# Patient Record
Sex: FEMALE | Race: WHITE | NOT HISPANIC OR LATINO | Employment: OTHER | URBAN - METROPOLITAN AREA
[De-identification: names, ages, dates, MRNs, and addresses within clinical notes are randomized per-mention and may not be internally consistent; named-entity substitution may affect disease eponyms.]

---

## 2018-08-23 ENCOUNTER — OFFICE VISIT (OUTPATIENT)
Dept: INTERNAL MEDICINE | Facility: CLINIC | Age: 73
End: 2018-08-23
Attending: INTERNAL MEDICINE
Payer: MEDICARE

## 2018-08-23 VITALS
HEIGHT: 63 IN | OXYGEN SATURATION: 93 % | HEART RATE: 65 BPM | TEMPERATURE: 98 F | RESPIRATION RATE: 17 BRPM | SYSTOLIC BLOOD PRESSURE: 120 MMHG | WEIGHT: 125.6 LBS | BODY MASS INDEX: 22.25 KG/M2 | DIASTOLIC BLOOD PRESSURE: 60 MMHG

## 2018-08-23 DIAGNOSIS — Z00.00 MEDICARE ANNUAL WELLNESS VISIT, SUBSEQUENT: Primary | ICD-10-CM

## 2018-08-23 PROCEDURE — G0439 PPPS, SUBSEQ VISIT: HCPCS | Performed by: INTERNAL MEDICINE

## 2018-08-23 ASSESSMENT — MINI COG
TOTAL SCORE: 5
COMPLETED: YES

## 2018-08-23 NOTE — PROGRESS NOTES
"Subjective     Kandi Garnica is a 72 y.o. female who presents for a subsequent annual wellness visit.     Had both cataracts done and feels well  Due to see Dr Ford    Comprehensive Medical and Social History  Patient Active Problem List   Diagnosis   • Medicare annual wellness visit, subsequent     Past Medical History:   Diagnosis Date   • Arthritis    • Hammer toes, bilateral    • Hearing loss    • Lipid disorder      Past Surgical History:   Procedure Laterality Date   • BUNIONECTOMY     •  SECTION     • EYE SURGERY      bilateral cataracts     No Known Allergies  No current outpatient prescriptions on file.     No current facility-administered medications for this visit.      Social History     Social History   • Marital status:      Spouse name: N/A   • Number of children: N/A   • Years of education: N/A     Social History Main Topics   • Smoking status: Never Smoker   • Smokeless tobacco: Never Used   • Alcohol use None   • Drug use: Unknown   • Sexual activity: Not Asked     Other Topics Concern   • None     Social History Narrative   • None       Objective   Vitals  Vitals:    18 0922   BP: 120/60   Pulse: 65   Resp: 17   Temp: 36.7 °C (98 °F)   TempSrc: Oral   SpO2: 93%   Weight: 57 kg (125 lb 9.6 oz)   Height: 1.6 m (5' 3\")     Body mass index is 22.25 kg/m².    Advanced Care Plan  Does patient have advance directive?: Yes       Patient has Advance Directive: Advance Directive in physical chart                             PHQ  Over the Past 2 Weeks, Have You Felt Down, Depressed or Hopeless?: no  Over the Past 2 Weeks, Have You Felt Little Interest or Pleasure In Doing Things?: no                                          Mini Cog  Completed: Yes  Score: 5  Result: Negative    Get Up and Go  Result: Pass            STEADI Falls Risk  One or more falls in the last year: No           Has trouble stepping up onto a curb: No   Advised to use a cane or walker to get around safely: No "   Often has to rush to the toilet: No   Feels unsteady when walking: No   Has lost some feeling in feet: No   Often feels sad or depressed: No   Steadies self on furniture while walking at home: No   Takes medication that makes him/her feel lightheaded or more tired than usual: No   Worried about falling: No   Takes medicine to sleep or improve mood: No   Needs to push with hands when rising from a chair: No   Falls screen completed: Yes       Hearing and Vision Screening   Visual Acuity Screening    Right eye Left eye Both eyes   Without correction: 20/30 20/30 20/20   With correction:            Assessment/Plan   Problem List Items Addressed This Visit        Other    Medicare annual wellness visit, subsequent - Primary     Health maintenance measures reviewed  She is due  to see Dr De Los Santos  Declined colonoscopy.,mammogram and DEXa scan  'will see Dr Acuna for hearing eval  She has a healthy diet and exercises regularly               See Patient Instructions (the written plan) which was given to the patient for PPPS and health risk factors with interventions.

## 2018-08-24 NOTE — ASSESSMENT & PLAN NOTE
Health maintenance measures reviewed  She is due  to see Dr De Los Santos  Declined colonoscopy.,mammogram and DEXa scan  'will see Dr Acuna for hearing eval  She has a healthy diet and exercises regularly

## 2018-10-22 ENCOUNTER — OFFICE VISIT (OUTPATIENT)
Dept: OTOLARYNGOLOGY | Facility: CLINIC | Age: 73
End: 2018-10-22
Payer: MEDICARE

## 2018-10-22 ENCOUNTER — PROCEDURE VISIT (OUTPATIENT)
Dept: OTOLARYNGOLOGY | Facility: CLINIC | Age: 73
End: 2018-10-22
Payer: MEDICARE

## 2018-10-22 VITALS
WEIGHT: 130 LBS | HEIGHT: 63 IN | SYSTOLIC BLOOD PRESSURE: 130 MMHG | BODY MASS INDEX: 23.04 KG/M2 | DIASTOLIC BLOOD PRESSURE: 84 MMHG

## 2018-10-22 DIAGNOSIS — H61.23 BILATERAL IMPACTED CERUMEN: ICD-10-CM

## 2018-10-22 DIAGNOSIS — H90.3 SENSORINEURAL HEARING LOSS (SNHL), BILATERAL: Primary | ICD-10-CM

## 2018-10-22 DIAGNOSIS — H90.3 SENSORINEURAL HEARING LOSS (SNHL) OF BOTH EARS: Primary | ICD-10-CM

## 2018-10-22 PROCEDURE — 92557 COMPREHENSIVE HEARING TEST: CPT | Performed by: AUDIOLOGIST

## 2018-10-22 PROCEDURE — 99203 OFFICE O/P NEW LOW 30 MIN: CPT | Mod: 25 | Performed by: OTOLARYNGOLOGY

## 2018-10-22 PROCEDURE — 92567 TYMPANOMETRY: CPT | Performed by: AUDIOLOGIST

## 2018-10-22 PROCEDURE — 99999 PR OFFICE/OUTPT VISIT,PROCEDURE ONLY: CPT | Performed by: AUDIOLOGIST

## 2018-10-22 ASSESSMENT — ENCOUNTER SYMPTOMS
FATIGUE: 0
WEAKNESS: 0
VOICE CHANGE: 0
PALPITATIONS: 0
POLYPHAGIA: 0
RHINORRHEA: 0
DIZZINESS: 0
NAUSEA: 0
DYSPHORIC MOOD: 0
BACK PAIN: 0
DIARRHEA: 0
FREQUENCY: 0
SINUS PRESSURE: 0
SHORTNESS OF BREATH: 0
MYALGIAS: 0
ADENOPATHY: 0
SLEEP DISTURBANCE: 0
ARTHRALGIAS: 0
HEADACHES: 0
CONSTIPATION: 0
FEVER: 0
TROUBLE SWALLOWING: 0
WHEEZING: 0
COUGH: 0
VOMITING: 0
NERVOUS/ANXIOUS: 0

## 2018-10-22 NOTE — PROGRESS NOTES
[facility]  [address], [address]  [place] [address]  Phone: [phone]  Fax: [phone]      Patient ID: Kandi Garnica                              : 1945    Visit Date: 10/22/2018  Referring Provider: Mia Marques MD    Chief Complaint: Hearing Loss    Kandi Garnica is a 72 y.o. female who presented to the [place] office today for consultation in regard to hearing loss.    I last saw Ms. Garnica over 3 years ago.  At the time she had bilateral sensorineural hearing loss for which amplification was an option but was not deemed necessary by the patient.  Now, her hearing seems to have gotten worse.  She asks her grandchildren to repeat themselves frequently and can not hear the television when they set the volume.  She sometimes feels as if she is lipreading.  She denies tinnitus and has not had vertigo.  She has never undergone otologic surgery nor has she suffered from otitis media with effusion.  She was exposed to some loud music in her youth but has not experienced significant noise trauma as an adult.    Kandi is otherwise feeling quite well.  She has not had allergic rhinitis this season and denies any recent surgeries.    Review of Systems   Constitutional: Negative for fatigue and fever.   HENT: Positive for hearing loss. Negative for congestion, nosebleeds, postnasal drip, rhinorrhea, sinus pressure, tinnitus, trouble swallowing and voice change.    Eyes: Negative for visual disturbance.   Respiratory: Negative for cough, shortness of breath and wheezing.    Cardiovascular: Negative for chest pain and palpitations.   Gastrointestinal: Negative for constipation, diarrhea, nausea and vomiting.   Endocrine: Negative for polyphagia and polyuria.   Genitourinary: Negative for frequency.   Musculoskeletal: Negative for arthralgias, back pain, gait problem and myalgias.   Skin: Negative for rash.   Allergic/Immunologic: Negative for environmental allergies.  "  Neurological: Negative for dizziness, weakness and headaches.   Hematological: Negative for adenopathy.   Psychiatric/Behavioral: Negative for dysphoric mood and sleep disturbance. The patient is not nervous/anxious.      Current Medications: None.    Past Medical History: Reviewed with the patient, no significant past medical history except for motion sickness.    Past Surgical History: Status post  section and 3 foot surgeries for bunion and hammertoes.    Social History: The patient is .  She has adult children.  She is retired.  She rarely drinks alcohol.  She has never used tobacco.  She has been exposed to loud music in the past.    Family History: Not contributory to the patient's current problem.    Allergies: No known drug allergies.    Physical Exam:  Vitals: /84   Ht 1.6 m (5' 3\")   Wt 59 kg (130 lb)   BMI 23.03 kg/m²   General:  Well-developed, well-nourished 72 y.o. in no acute distress.  Voice: Normal without hoarseness, breathiness or stridor.  Head/face:  No scars or lesions.  No parotid masses. No presinus tenderness. Seventh cranial nerves intact.  Eyes:  Extraocular movements and gaze alignment normal.  Ears: Auricles normal.  External auditory canals clear to of minimal dry cerumen with micro-suction under binocular magnification.  Tympanic membranes clear, mobile and without retractions.  Nose:  Dorsum straight.  Septum sinusoidal anteriorly without obstruction.  Turbinates pink, normal in size and orientation.  No pus, polyps or crusts.  Oral Cavity/oropharynx:  Normal tongue thrust. Normal gag reflex. No masses, leukoplakia, erythroplakia, ulcers or other abnormalities at the tongue, floor of mouth, buccal mucosa, palate or posterior pharyngeal wall.  Larynx/hypopharynx:  Normal supraglottis.  Vocal folds smooth and white.  Arytenoids sharp and mobile.  Interarytenoid and post-glottic mucosa normal.  No masses at the base of tongue, vallecula or piriform " sinuses.  Neck:  No masses, adenopathy, cervical spasm or thyroid enlargement.  Cranial Nerves II through XII: Grossly intact.  Mental status: Awake, alert and oriented ×3.  No depression or anxiety.    Audiogram: Symmetrical mild sloping to moderate sensorineural hearing loss above 1000 Hz bilaterally.  Discrimination scores: 100% at 65 dB on the right, 96% at 65 dB on the left.    Tympanogram: We will bilaterally.    Impression:  1.  Bilateral sensorineural hearing loss for which amplification is indicated.  2.  Cerumen impactions, removed with symptom relief.    Recommendations and Plan:  1.  The patient was encouraged to return to be fitted with hearing aids or to see any other reputable audiologist for this service.  2.  Debrox, 2 drops to each ear on the first and 15th nights of the month.  3.  Follow-up in 1 year for an ear check.        Meenakshi Acuna MD

## 2018-10-22 NOTE — PATIENT INSTRUCTIONS
1.  You have hearing loss for which hearing aids would be helpful.  We can help you here at the [place] office or you can visit any reputable audiologist.  Check with your secondary insurer to see if you should go somewhere specific.  If you want to come to [place], call and ask for a hearing aid evaluation with Dr. Gibson.  2.  Debrox, 2 drops to each ear on the first and 15th nights of the month.  3.  Follow-up in 1 year for an ear check.

## 2019-09-27 ENCOUNTER — OFFICE VISIT (OUTPATIENT)
Dept: INTERNAL MEDICINE | Facility: CLINIC | Age: 74
End: 2019-09-27
Payer: MEDICARE

## 2019-09-27 VITALS
DIASTOLIC BLOOD PRESSURE: 82 MMHG | SYSTOLIC BLOOD PRESSURE: 126 MMHG | TEMPERATURE: 98.4 F | WEIGHT: 129.4 LBS | HEART RATE: 66 BPM | RESPIRATION RATE: 18 BRPM | BODY MASS INDEX: 22.93 KG/M2 | HEIGHT: 63 IN

## 2019-09-27 DIAGNOSIS — Z00.00 MEDICARE ANNUAL WELLNESS VISIT, SUBSEQUENT: Primary | ICD-10-CM

## 2019-09-27 DIAGNOSIS — R73.9 HYPERGLYCEMIA: ICD-10-CM

## 2019-09-27 DIAGNOSIS — E78.5 HYPERLIPIDEMIA, UNSPECIFIED HYPERLIPIDEMIA TYPE: ICD-10-CM

## 2019-09-27 DIAGNOSIS — E55.9 VITAMIN D DEFICIENCY: ICD-10-CM

## 2019-09-27 LAB
25(OH)D3 SERPL-MCNC: 24 NG/ML (ref 30–100)
ALBUMIN SERPL-MCNC: 4 G/DL (ref 3.4–5)
ALP SERPL-CCNC: 48 IU/L (ref 35–126)
ALT SERPL-CCNC: 18 IU/L (ref 11–54)
ANION GAP SERPL CALC-SCNC: 8 MEQ/L (ref 3–15)
AST SERPL-CCNC: 21 IU/L (ref 15–41)
BASOPHILS # BLD: 0.04 K/UL (ref 0.01–0.1)
BASOPHILS NFR BLD: 1 %
BILIRUB SERPL-MCNC: 0.8 MG/DL (ref 0.3–1.2)
BUN SERPL-MCNC: 12 MG/DL (ref 8–20)
CALCIUM SERPL-MCNC: 9.7 MG/DL (ref 8.9–10.3)
CHLORIDE SERPL-SCNC: 106 MEQ/L (ref 98–109)
CHOLEST SERPL-MCNC: 238 MG/DL
CO2 SERPL-SCNC: 29 MEQ/L (ref 22–32)
CREAT SERPL-MCNC: 0.8 MG/DL
DIFFERENTIAL METHOD BLD: NORMAL
EOSINOPHIL # BLD: 0.08 K/UL (ref 0.04–0.36)
EOSINOPHIL NFR BLD: 1.9 %
ERYTHROCYTE [DISTWIDTH] IN BLOOD BY AUTOMATED COUNT: 13.2 % (ref 11.7–14.4)
GFR SERPL CREATININE-BSD FRML MDRD: >60 ML/MIN/1.73M*2
GLUCOSE SERPL-MCNC: 87 MG/DL (ref 70–99)
HCT VFR BLDCO AUTO: 41.4 %
HDLC SERPL-MCNC: 97 MG/DL
HDLC SERPL: 2.5 {RATIO}
HGB BLD-MCNC: 13.2 G/DL
IMM GRANULOCYTES # BLD AUTO: 0.01 K/UL (ref 0–0.08)
IMM GRANULOCYTES NFR BLD AUTO: 0.2 %
LDLC SERPL CALC-MCNC: 127 MG/DL
LYMPHOCYTES # BLD: 1.3 K/UL (ref 1.2–3.5)
LYMPHOCYTES NFR BLD: 30.9 %
MCH RBC QN AUTO: 30.9 PG (ref 28–33.2)
MCHC RBC AUTO-ENTMCNC: 31.9 G/DL (ref 32.2–35.5)
MCV RBC AUTO: 97 FL (ref 83–98)
MONOCYTES # BLD: 0.5 K/UL (ref 0.28–0.8)
MONOCYTES NFR BLD: 11.9 %
NEUTROPHILS # BLD: 2.28 K/UL (ref 1.7–7)
NEUTS SEG NFR BLD: 54.1 %
NONHDLC SERPL-MCNC: 141 MG/DL
NRBC BLD-RTO: 0 %
PDW BLD AUTO: 11.8 FL (ref 9.4–12.3)
PLATELET # BLD AUTO: 244 K/UL
POTASSIUM SERPL-SCNC: 4.7 MEQ/L (ref 3.6–5.1)
PROT SERPL-MCNC: 6.1 G/DL (ref 6–8.2)
RBC # BLD AUTO: 4.27 M/UL (ref 3.93–5.22)
SODIUM SERPL-SCNC: 143 MEQ/L (ref 136–144)
TRIGL SERPL-MCNC: 69 MG/DL (ref 30–149)
TSH SERPL DL<=0.05 MIU/L-ACNC: 2.23 MIU/L (ref 0.34–5.6)
WBC # BLD AUTO: 4.21 K/UL

## 2019-09-27 PROCEDURE — 80053 COMPREHEN METABOLIC PANEL: CPT | Performed by: INTERNAL MEDICINE

## 2019-09-27 PROCEDURE — 84443 ASSAY THYROID STIM HORMONE: CPT | Performed by: INTERNAL MEDICINE

## 2019-09-27 PROCEDURE — 85025 COMPLETE CBC W/AUTO DIFF WBC: CPT | Performed by: INTERNAL MEDICINE

## 2019-09-27 PROCEDURE — 84478 ASSAY OF TRIGLYCERIDES: CPT | Performed by: INTERNAL MEDICINE

## 2019-09-27 PROCEDURE — G0439 PPPS, SUBSEQ VISIT: HCPCS | Performed by: INTERNAL MEDICINE

## 2019-09-27 PROCEDURE — 82306 VITAMIN D 25 HYDROXY: CPT | Performed by: INTERNAL MEDICINE

## 2019-09-27 PROCEDURE — 83036 HEMOGLOBIN GLYCOSYLATED A1C: CPT | Performed by: INTERNAL MEDICINE

## 2019-09-27 ASSESSMENT — MINI COG
TOTAL SCORE: 5
COMPLETED: YES

## 2019-09-27 NOTE — ASSESSMENT & PLAN NOTE
Health maintenance measures reviewed  She is due  to see Dr De Los Santos  Declined colonoscopy.,mammogram and DEXa scan  'sees  Dr Acuna for hearing eval  She has a healthy diet and exercises regularly  She did well on mini cog  and clock test  Will consult podiatry for foot pain

## 2019-09-27 NOTE — PATIENT INSTRUCTIONS
PLAN    Get fasting blood work  Cont to eat healthy and exercise   Drink lots of fluids  Consult podiatrist  See me in a year                         Your Personalized Prevention Plan Services (PPPS)    Preventive Services Checklist (Assumes Average Risk Unless Otherwise Noted):    Health Maintenance Topics with due status: Overdue       Topic Date Due    DEXA Scan 1945    DTaP, Tdap, and Td Vaccines 11/08/1963    Zoster Vaccine 11/08/1994    Mammogram 11/08/1994    Colonoscopy 11/08/1994    Pneumococcal (65 years and older) 11/08/2009    Influenza Vaccine 08/01/2019    Medicare Annual Wellness Visit 08/23/2019     Health Maintenance Topics with due status: Not Due       Topic Last Completion Date    Annual Falls Risk Screening 09/27/2019     Health Maintenance Topics with due status: Completed / Addressed / Aged Out       Topic Last Completion Date    Pneumococcal 01/01/2007    Meningococcal ACWY Aged Out    Varicella Vaccines Aged Out    HIB Vaccines Aged Out    IPV Vaccines Aged Out    HPV Vaccines Aged Out       You May Be Eligible for These Additional Preventive Services   (Assumes Average Risk Unless Otherwise Noted)  Diabetes Screening Any 1 risk factor: hypertension, dyslipidemia, obesity, high glucose; or Any 2 risk factors: >=66yo, overweight, family history diabetes (covered every 6 months)   Hepatitis C Screening Any 1 risk factor: 1) blood transfusion before 1992,   2) current or past injection drug use (annually for high risk; if born between 2241-6805, see above for status).   Vaccine: Hepatitis B As necessary if at-risk: hemophilia, ESRD, diabetes, living with individual infected with hep B, healthcare worker with frequent contact with blood/bodily fluids (series covered once)   Sexually Transmitted Diseases (STDs) As necessary chlamydia, gonorrhea, syphilis, hepatitis B (covered annually)  HIV if any 1 risk factor present: 1) <16yo or >66yo and at increased risk or 2) 15-66yo and ask for it  (covered annually)   Lung Cancer Screening Low dose chest CT if all 3 risk factors: 1) 55-76yo, 2) smoker or quit within last 15y, 3) >=30 pack years (covered annually).  No results found for this or any previous visit.     Cholesterol Screening Both risk factors: 1) >=21yo and 2)  increased risk coronary artery disease (covered every 5 years).     Breast Cancer Screening Covered once 35-40yo, annually >=41yo (if >=51yo, see above for status).         Health Risk Factors with Personalized Education:  ----------------------------------------------------------------------------------------------------------------------  Controlling Your Blood Pressure  · Maintain a normal weight (body mass index between 18.5 and 24.9).  · Eat more fruit, vegetables and low-fat dairy.  · Eat less saturated fat and total fat.  · Lower your sodium (salt) intake.  Try to stay under 1500 mg per day, but if you cannot get your intake to be that low, at least lower it by 1000 mg.  · Stay active.  Try to get at least 90 to 150 minutes of exercise per week.  Try brisk walking, swimming, bicycling or dancing.  · Limit alcohol intake.  When you do consume alcohol, drink no more than 1 drink per day.  · If you have been prescribed medication, take it regularly and exactly as prescribed.  Let your PCP know if you have any problems or questions about your medication.  · Check your blood pressure at home or at the store.  Write down your readings and share them with your PCP  ----------------------------------------------------------------------------------------------------------------------  Controlling Your Cholesterol  · Reduce the amount of saturated and trans fat in your diet.  Limit intake of red meat.  Consume only low-fat or non-fat/skim dairy.  Limit fried food.  Cook with vegetable oils.  · Reduce your intake of sugary foods, sugary drinks and alcohol.  · Eat a diet high in fruit, vegetables and whole grains.  · Get protein from fish,  poultry and a small portion of nuts.  · Stay active.  Try to get at least 90 to 150 minutes of exercise per week.  Try brisk walking, swimming, bicycling or dancing.  · Maintain a healthy weight by balancing your diet and exercise.  · If you have been prescribed medication, take it regularly and exactly as prescribed. Let your PCP know if you have any problems or questions about your medication.  · It’s important to know your cholesterol numbers.  When recommended by your PCP, get the cholesterol blood test.  ----------------------------------------------------------------------------------------------------------------------  Maintaining Strong Bones  · Try to get at least 90 to 150 minutes of weight-bearing exercise per week.  · Ensure intake of at least 1200mg of calcium per day.  Eat foods high in calcium like milk and other dairy, green vegetables, fruit, canned fish with soft and edible bones, nuts, calcium-set tofu.  Some foods are calcium-fortified, like bread, cereal, fruit juices and mineral water.  · Help your body make vitamin D by getting 10-15 minutes per day of sunlight.    · Ensure intake of at least 600IU of vitamin D per day.  Eat foods high in vitamin D like oily fish (salmon, sardines, mackerel) and eggs.  Some foods are fortified with vitamin D, like dairy and cereals.  · Avoid high amounts of caffeine and salt, since they can cause the body to loose calcium.  · Limit alcohol intake, since it is associated with weaker bones and is associated with falls and fractures.  · Limit intake of fizzy drinks.  ----------------------------------------------------------------------------------------------------------------------  Reducing Your Risk of Falls  · Tell your PCP if any of your medications make you feel tired, dizzy, lightheaded or off-balance.  · Maintain coordination, flexibility and balance by ensuring regular physical activity.  · Limit alcohol intake to 1 drink per day.  Consider avoiding  all alcohol intake.  · Ensure good vision.  Visit an ophthalmologist or optometrist regularly for vision screening or to make sure your glasses / contact lens prescription is correct.  If you need glasses or contacts, wear them.  When you get new glasses or contacts, take time to get used to them.  Do not wear sunglasses or tinted lenses when indoors.  · Ensure good hearing.  Have your hearing checked if you are having trouble hearing, or family and friends think you cannot hear them.  If you need a hearing aid, be sure it fits well and wear it.  · Get enough rest.  Ensure about 7-9 hours of sleep every day.  · Get up slowly from your bed or chairs.  Do not start walking until you are sure you feel steady.  · Wear non-skid, rubber-soled, low-heeled shoes.  Do not walk in socks, or in shoes and slippers with smooth soles.  · If your PCP or therapist recommends using a cane or walker, use it regularly.  · Make your home safer.  Increase lighting throughout the house, especially at the top and bottom of stairs.  Ensure lighting is easily turned on when getting up in the middle of the night.  Make sure there are two secure rails on all stairs.  Install grab bars in the bathtub / shower and near the toilet.  Consider using a shower chair and / or a hand-held shower.  · Spread sand or salt on icy surfaces.  Beware of wet surfaces, which can be icy.  · Tell your PCP if you have fallen.

## 2019-09-27 NOTE — PROGRESS NOTES
"Subjective     Kandi Garnica is a 73 y.o. female who presents for a subsequent annual wellness visit.     Here for wellness exam  Has been tired  And she just came back from a 14 day trip to [place]  Also wants to consult ortho for left foot pain and known bunion.      Comprehensive Medical and Social History  Patient Active Problem List   Diagnosis   • Medicare annual wellness visit, subsequent   • Hyperlipidemia   • Hyperglycemia   • Vitamin D deficiency     Past Medical History:   Diagnosis Date   • Arthritis    • Hammer toes, bilateral    • Hearing loss    • Lipid disorder      Past Surgical History:   Procedure Laterality Date   • BUNIONECTOMY     •  SECTION     • EYE SURGERY      bilateral cataracts     No Known Allergies  No current outpatient prescriptions on file.     No current facility-administered medications for this visit.      Social History     Social History   • Marital status:      Spouse name: N/A   • Number of children: N/A   • Years of education: N/A     Social History Main Topics   • Smoking status: Never Smoker   • Smokeless tobacco: Never Used   • Alcohol use Yes      Comment: wine   • Drug use: No   • Sexual activity: Not Asked     Other Topics Concern   • None     Social History Narrative   • None       Objective   Vitals  Vitals:    19 0915   BP: 126/82   Pulse: 66   Resp: 18   Temp: 36.9 °C (98.4 °F)   TempSrc: Oral   Weight: 58.7 kg (129 lb 6.4 oz)   Height: 1.6 m (5' 3\")     Body mass index is 22.92 kg/m².    Advanced Care Plan  Does patient have advance directive?: Yes       Patient has Advance Directive: Advance Directive from prior hospitalizaton in EMR                             PHQ  Over the Past 2 Weeks, Have You Felt Down, Depressed or Hopeless?: no  Over the Past 2 Weeks, Have You Felt Little Interest or Pleasure In Doing Things?: no                                          Mini Cog  Completed: Yes  Score: 5  Result: Negative    Get Up and Go  Result: " Pass            STEADI Falls Risk  One or more falls in the last year: No           Has trouble stepping up onto a curb: No   Advised to use a cane or walker to get around safely: No   Often has to rush to the toilet: No   Feels unsteady when walking: No   Has lost some feeling in feet: No   Often feels sad or depressed: No   Steadies self on furniture while walking at home: No   Takes medication that makes him/her feel lightheaded or more tired than usual: No   Worried about falling: No   Takes medicine to sleep or improve mood: No   Needs to push with hands when rising from a chair: No   Falls screen completed: Yes       Hearing and Vision Screening   Visual Acuity Screening    Right eye Left eye Both eyes   Without correction: 20/30 20/40 20/30   With correction:        See HRA for relevant hearing screening response.      Assessment/Plan   Diagnoses and all orders for this visit:    Medicare annual wellness visit, subsequent (Primary)  Assessment & Plan:  Health maintenance measures reviewed  She is due  to see Dr De Los Santos  Declined colonoscopy.,mammogram and DEXa scan  'sees  Dr Acuna for hearing eval  She has a healthy diet and exercises regularly  She did well on mini cog  and clock test  Will consult podiatry for foot pain      Hyperlipidemia, unspecified hyperlipidemia type  Assessment & Plan:   history of mild elevation of lipids  Check fasting blood work for lipids    Orders:  -     CBC and differential  -     Comprehensive metabolic panel  -     Lipid panel  -     TSH  -     CBC  -     Diff Count    Hyperglycemia  Assessment & Plan:  Check FBS and A1c    Orders:  -     Hemoglobin A1c    Vitamin D deficiency  Assessment & Plan:  Check vitamin D level    Orders:  -     Vitamin D 25 hydroxy      See Patient Instructions (the written plan) which was given to the patient for PPPS and health risk factors with interventions.

## 2019-09-28 LAB
EST. AVERAGE GLUCOSE BLD GHB EST-MCNC: 108 MG/DL
HBA1C MFR BLD HPLC: 5.4 %

## 2019-09-30 ENCOUNTER — TELEPHONE (OUTPATIENT)
Dept: INTERNAL MEDICINE | Facility: CLINIC | Age: 74
End: 2019-09-30

## 2019-09-30 NOTE — LETTER
September 30, 2019     Kandi Garnica  [address] [place] [address]      Dear Ms. Garnica:    Below are the results from your recent visit:    Resulted Orders   Comprehensive metabolic panel   Result Value Ref Range    Sodium 143 136 - 144 mEQ/L    Potassium 4.7 3.6 - 5.1 mEQ/L    Chloride 106 98 - 109 mEQ/L    CO2 29 22 - 32 mEQ/L    BUN 12 8 - 20 mg/dL    Creatinine 0.8 0.6 - 1.1 mg/dL    Glucose 87 70 - 99 mg/dL    Calcium 9.7 8.9 - 10.3 mg/dL    AST (SGOT) 21 15 - 41 IU/L    ALT (SGPT) 18 11 - 54 IU/L    Alkaline Phosphatase 48 35 - 126 IU/L    Total Protein 6.1 6.0 - 8.2 g/dL    Albumin 4.0 3.4 - 5.0 g/dL    Bilirubin, Total 0.8 0.3 - 1.2 mg/dL    eGFR >60.0 >=60.0 mL/min/1.73m*2    Anion Gap 8 3 - 15 mEQ/L   Hemoglobin A1c   Result Value Ref Range    Hemoglobin A1C 5.4 <5.7 %    Estimated Ave Glucose 108 mg/dL      Comment:        Estimate of average glucose concentration continuously over 24 hours for previous 2 to 3 months(Per ADA Recommendation).   Lipid panel   Result Value Ref Range    Triglycerides 69 30 - 149 mg/dL    Cholesterol 238 (H) <=200 mg/dL    HDL 97 >=55 mg/dL      Comment:      2001 NCEP GUIDELINES  <40 mg/dL Low  >=60 mg/dL High    LDL Calculated 127 (H) <=100 mg/dL      Comment:      ATP III GUIDELINES  Optimal: <100 mg/dL  Near/Above Optimal: 100-129 mg/dL  Borderline High: 130-159 mg/dL  High: 160-189 mg/dL  Very High: >190 mg/dL      Non-HDL, Calculated 141 mg/dL    RISK 2.5 <=5.0      Comment:      RISK FEMALE ([place] [employer])  1/2 Average 3.3  Average 4.4  2X Average 7.1  3X Average 11.0   TSH   Result Value Ref Range    TSH 2.23 0.34 - 5.60 mIU/L   Vitamin D 25 hydroxy   Result Value Ref Range    Vit D, 25-Hydroxy 24 (L) 30 - 100 ng/mL      Comment:        INTERPRETATION  VITAMIN D STATUS   25 OH VITAMIN D    Deficiency          <20 ng/mL    Insufficiency     20-29 ng/mL    Sufficiency       ng/mL    Toxicity           >100 ng/mL  This test measures a  total of both 25 OH D2 and 25 OH D3.   CBC   Result Value Ref Range    WBC 4.21 3.80 - 10.50 K/uL    RBC 4.27 3.93 - 5.22 M/uL    Hemoglobin 13.2 11.8 - 15.7 g/dL    Hematocrit 41.4 35.0 - 45.0 %    MCV 97.0 83.0 - 98.0 fL    MCH 30.9 28.0 - 33.2 pg    MCHC 31.9 (L) 32.2 - 35.5 g/dL    RDW 13.2 11.7 - 14.4 %    Platelets 244 150 - 369 K/uL    MPV 11.8 9.4 - 12.3 fL   Diff Count   Result Value Ref Range    Differential Type Auto     nRBC 0.0 <=0.0 %    Immature Granulocytes 0.2 %    Neutrophils 54.1 %    Lymphocytes 30.9 %    Monocytes 11.9 %    Eosinophils 1.9 %    Basophils 1.0 %    Immature Granulocytes, Absolute 0.01 0.00 - 0.08 K/uL    Neutrophils, Absolute 2.28 1.70 - 7.00 K/uL    Lymphocytes, Absolute 1.30 1.20 - 3.50 K/uL    Monocytes, Absolute 0.50 0.28 - 0.80 K/uL    Eosinophils, Absolute 0.08 0.04 - 0.36 K/uL    Basophils, Absolute 0.04 0.01 - 0.10 K/uL     As per my phone message, this is a copy of the test results  Please call me back if you have any questions     Sincerely,          Mia Marques MD

## 2019-09-30 NOTE — TELEPHONE ENCOUNTER
Left message with the patient regarding test results and to call me back if she has any questions after she gets a copy

## 2020-11-24 ENCOUNTER — OFFICE VISIT (OUTPATIENT)
Dept: INTERNAL MEDICINE | Facility: CLINIC | Age: 75
End: 2020-11-24
Payer: MEDICARE

## 2020-11-24 VITALS
WEIGHT: 125 LBS | OXYGEN SATURATION: 98 % | HEIGHT: 63 IN | DIASTOLIC BLOOD PRESSURE: 70 MMHG | BODY MASS INDEX: 22.15 KG/M2 | HEART RATE: 81 BPM | RESPIRATION RATE: 16 BRPM | TEMPERATURE: 97.1 F | SYSTOLIC BLOOD PRESSURE: 110 MMHG

## 2020-11-24 DIAGNOSIS — E78.5 HYPERLIPIDEMIA, UNSPECIFIED HYPERLIPIDEMIA TYPE: ICD-10-CM

## 2020-11-24 DIAGNOSIS — Z00.00 MEDICARE ANNUAL WELLNESS VISIT, SUBSEQUENT: Primary | ICD-10-CM

## 2020-11-24 DIAGNOSIS — M79.673 PAIN OF FOOT, UNSPECIFIED LATERALITY: ICD-10-CM

## 2020-11-24 PROCEDURE — G0439 PPPS, SUBSEQ VISIT: HCPCS | Performed by: INTERNAL MEDICINE

## 2020-11-24 ASSESSMENT — MINI COG
TOTAL SCORE: 5
COMPLETED: YES

## 2020-11-24 ASSESSMENT — PATIENT HEALTH QUESTIONNAIRE - PHQ9: SUM OF ALL RESPONSES TO PHQ9 QUESTIONS 1 & 2: 0

## 2020-11-24 NOTE — PROGRESS NOTES
"Subjective     Kandi Garnica is a 75 y.o. female who presents for a subsequent annual wellness visit.     Patient Care Team:  Mia Marques MD as PCP - General      Here for wellness exam  Has been trying to follow guidelines during the pandemic  She has issues with arthritis but exercises daily  Not keen to get blood work this year  Would like to see podiatry  Her gyn is Dr Ford  Sees Dr barker for hearing loss and will consider hearing aids next year    Comprehensive Medical and Social History  Patient Active Problem List   Diagnosis   • Medicare annual wellness visit, subsequent   • Hyperlipidemia   • Hyperglycemia   • Vitamin D deficiency   • Pain of foot     Past Medical History:   Diagnosis Date   • Arthritis    • Hammer toes, bilateral    • Hearing loss    • Lipid disorder      Past Surgical History:   Procedure Laterality Date   • BUNIONECTOMY     •  SECTION     • EYE SURGERY      bilateral cataracts     No Known Allergies  No current outpatient medications on file.     No current facility-administered medications for this visit.      Social History     Tobacco Use   • Smoking status: Never Smoker   • Smokeless tobacco: Never Used   Substance Use Topics   • Alcohol use: Yes     Comment: wine   • Drug use: No     Family History   Problem Relation Age of Onset   • Heart disease Biological Mother    • Heart disease Biological Father        Objective   Vitals  Vitals:    20 0717   BP: 110/70   Pulse: 81   Resp: 16   Temp: 36.2 °C (97.1 °F)   TempSrc: Oral   SpO2: 98%   Weight: 56.7 kg (125 lb)   Height: 1.6 m (5' 3\")     Body mass index is 22.14 kg/m².    Advanced Care Plan  Does patient have advance directive?: Yes           Does patient have current OOH DNR form?: Yes                         PHQ  Will the patinet answer the depression questions?: Yes   Over the past 2 weeks, how often have you been bothered by feeling little interest or pleasure in doing things?: Not at all   Over the " past 2 weeks, how often have you been bothered by feeling down, depressed, or hopeless?: Not at all   Depression Risk: 0                                             Mini Cog  Completed: Yes  Score: 5  Result: Negative      Get Up and Go  Result: Pass    STEADI Falls Risk  One or more falls in the last year: No           Has trouble stepping up onto a curb: No   Advised to use a cane or walker to get around safely: No   Often has to rush to the toilet: No   Feels unsteady when walking: No   Has lost some feeling in feet: No   Often feels sad or depressed: No   Steadies self on furniture while walking at home: No   Takes medication that makes him/her feel lightheaded or more tired than usual: No   Worried about falling: No   Takes medicine to sleep or improve mood: No   Needs to push with hands when rising from a chair: No   Falls screen completed: Yes     Hearing and Vision Screening   Hearing Screening    125Hz 250Hz 500Hz 1000Hz 2000Hz 3000Hz 4000Hz 6000Hz 8000Hz   Right ear:            Left ear:               Visual Acuity Screening    Right eye Left eye Both eyes   Without correction: 20/20 20/30 20/20   With correction:        See HRA for relevant hearing screening response.    Assessment/Plan   Diagnoses and all orders for this visit:    Medicare annual wellness visit, subsequent (Primary)  Assessment & Plan:  Health maintenance measures reviewed  Sees dr Ford for gyn  Labs reviewed from last year  Declined colonoscopy.,mammogram and DEXa scan  'sees  Dr Acuna for hearing eval  She has a healthy diet and exercises regularly  She did well on mini cog  and clock test  Safety precautions reveiwed  Eye exams advsied  Declined vaccines but will consider COVID vaccine      Pain of foot, unspecified laterality  Assessment & Plan:  Consult Dr ned Cortez    Orders:  -     Ambulatory referral to Podiatry; Future    Hyperlipidemia, unspecified hyperlipidemia type  Assessment & Plan:  History of elevated  lipids  Diet and exercise discussed        See Patient Instructions (the written plan) which was given to the patient for PPPS and health risk factors with interventions.

## 2020-11-25 NOTE — ASSESSMENT & PLAN NOTE
Health maintenance measures reviewed  Sees dr Ford for gyn  Labs reviewed from last year  Declined colonoscopy.,mammogram and DEXa scan  'sees  Dr Acuna for hearing eval  She has a healthy diet and exercises regularly  She did well on mini cog  and clock test  Safety precautions reveiwed  Eye exams advsied  Declined vaccines but will consider COVID vaccine

## 2021-02-21 ENCOUNTER — IMMUNIZATION (OUTPATIENT)
Dept: IMMUNIZATION | Facility: CLINIC | Age: 76
End: 2021-02-21

## 2021-02-23 ENCOUNTER — TELEPHONE (OUTPATIENT)
Dept: GASTROENTEROLOGY | Facility: CLINIC | Age: 76
End: 2021-02-23

## 2021-03-14 ENCOUNTER — IMMUNIZATION (OUTPATIENT)
Dept: IMMUNIZATION | Facility: CLINIC | Age: 76
End: 2021-03-14

## 2021-04-14 DIAGNOSIS — Z23 ENCOUNTER FOR IMMUNIZATION: ICD-10-CM

## 2021-05-20 NOTE — PATIENT INSTRUCTIONS
From: Allison Spann  To: Brenda Hood  Sent: 5/20/2021 12:42 PM CDT  Subject: Medication Question    Hi Dr. Hood!    I've been using the Lidocaine patches as directed and they don't really do much for the hip pain. Could I maybe get a prescription for some kind of pain pill? I see the Orthopedic Surgeon, Dr. Presley, on June 4th. Hopefully, I can get a Cortisone shot scheduled after that. This pain is preventing me from doing everyday things and is basically quite bad every time I move. It even hurts when I lay in bed at night.  Thanks,  Allison   PLAN    Deep breathing exercises advsied  Eye exam  Podiatry  Dr Montana Buenrostro  See me rashida  year                       Your Personalized Prevention Plan Services (PPPS)    Preventive Services Checklist (Assumes Average Risk Unless Otherwise Noted):    Health Maintenance Topics with due status: Overdue       Topic Date Due    DEXA Scan 1945    Varicella Vaccines 11/08/1945    Hepatitis C Screening 11/08/1962    DTaP, Tdap, and Td Vaccines 11/08/1963    Zoster Vaccine 11/08/1994    Pneumococcal (65 years and older) 01/01/2012    Influenza Vaccine 08/01/2020     Health Maintenance Topics with due status: Not Due       Topic Last Completion Date    Medicare Annual Wellness Visit 11/24/2020     Health Maintenance Topics with due status: Completed       Topic Last Completion Date    Annual Falls Risk Screening 11/24/2020     Health Maintenance Topics with due status: Aged Out       Topic Date Due    Meningococcal ACWY Aged Out    HIB Vaccines Aged Out    IPV Vaccines Aged Out    HPV Vaccines Aged Out    Pneumococcal Aged Out       You May Be Eligible for These Additional Preventive Services   (Assumes Average Risk Unless Otherwise Noted)  Diabetes Screening Any 1 risk factor: hypertension, dyslipidemia, obesity, high glucose; or Any 2 risk factors: >=64yo, overweight, family history diabetes (covered every 6 months)   Hepatitis C Screening Any 1 risk factor: 1) blood transfusion before 1992,   2) current or past injection drug use (annually for high risk; if born between 74[birth year]-9422, see above for status).   Vaccine: Hepatitis B As necessary if at-risk: hemophilia, ESRD, diabetes, living with individual infected with hep B, healthcare worker with frequent contact with blood/bodily fluids (series covered once)   Sexually Transmitted Diseases (STDs) As necessary chlamydia, gonorrhea, syphilis, hepatitis B (covered annually)  HIV if any 1 risk factor present: 1) <14yo or >64yo and at increased risk or 2) 15-64yo and ask  for it (covered annually)   Lung Cancer Screening Low dose chest CT if all 3 risk factors: 1) 55-78yo, 2) smoker or quit within last 15y, 3) >=30 pack years (covered annually).  No results found for this or any previous visit.     Cholesterol Screening Both risk factors: 1) >=21yo and 2)  increased risk coronary artery disease (covered every 5 years).     Breast Cancer Screening Covered once 35-40yo, annually >=39yo (if >=51yo, see above for status).         Health Risk Factors with Personalized Education:  ----------------------------------------------------------------------------------------------------------------------  Controlling Your Cholesterol  · Reduce the amount of saturated and trans fat in your diet.  Limit intake of red meat.  Consume only low-fat or non-fat/skim dairy.  Limit fried food.  Cook with vegetable oils.  · Reduce your intake of sugary foods, sugary drinks and alcohol.  · Eat a diet high in fruit, vegetables and whole grains.  · Get protein from fish, poultry and a small portion of nuts.  · Stay active.  Try to get at least 90 to 150 minutes of exercise per week.  Try brisk walking, swimming, bicycling or dancing.  · Maintain a healthy weight by balancing your diet and exercise.  · If you have been prescribed medication, take it regularly and exactly as prescribed. Let your PCP know if you have any problems or questions about your medication.  · It’s important to know your cholesterol numbers.  When recommended by your PCP, get the cholesterol blood test.  ----------------------------------------------------------------------------------------------------------------------  Maintaining Strong Bones  · Try to get at least 90 to 150 minutes of weight-bearing exercise per week.  · Ensure intake of at least 1200mg of calcium per day.  Eat foods high in calcium like milk and other dairy, green vegetables, fruit, canned fish with soft and edible bones, nuts, calcium-set tofu.  Some foods are  calcium-fortified, like bread, cereal, fruit juices and mineral water.  · Help your body make vitamin D by getting 10-15 minutes per day of sunlight.    · Ensure intake of at least 600IU of vitamin D per day.  Eat foods high in vitamin D like oily fish (salmon, sardines, mackerel) and eggs.  Some foods are fortified with vitamin D, like dairy and cereals.  · Avoid high amounts of caffeine and salt, since they can cause the body to loose calcium.  · Limit alcohol intake, since it is associated with weaker bones and is associated with falls and fractures.  · Limit intake of fizzy drinks.  ----------------------------------------------------------------------------------------------------------------------  Reducing Your Risk of Falls  · Tell your PCP if any of your medications make you feel tired, dizzy, lightheaded or off-balance.  · Maintain coordination, flexibility and balance by ensuring regular physical activity.  · Limit alcohol intake to 1 drink per day.  Consider avoiding all alcohol intake.  · Ensure good vision.  Visit an ophthalmologist or optometrist regularly for vision screening or to make sure your glasses / contact lens prescription is correct.  If you need glasses or contacts, wear them.  When you get new glasses or contacts, take time to get used to them.  Do not wear sunglasses or tinted lenses when indoors.  · Ensure good hearing.  Have your hearing checked if you are having trouble hearing, or family and friends think you cannot hear them.  If you need a hearing aid, be sure it fits well and wear it.  · Get enough rest.  Ensure about 7-9 hours of sleep every day.  · Get up slowly from your bed or chairs.  Do not start walking until you are sure you feel steady.  · Wear non-skid, rubber-soled, low-heeled shoes.  Do not walk in socks, or in shoes and slippers with smooth soles.  · If your PCP or therapist recommends using a cane or walker, use it regularly.  · Make your home safer.  Increase  lighting throughout the house, especially at the top and bottom of stairs.  Ensure lighting is easily turned on when getting up in the middle of the night.  Make sure there are two secure rails on all stairs.  Install grab bars in the bathtub / shower and near the toilet.  Consider using a shower chair and / or a hand-held shower.  · Spread sand or salt on icy surfaces.  Beware of wet surfaces, which can be icy.  · Tell your PCP if you have fallen.

## 2021-10-21 ENCOUNTER — PROCEDURE VISIT (OUTPATIENT)
Dept: OTOLARYNGOLOGY | Facility: CLINIC | Age: 76
End: 2021-10-21
Payer: MEDICARE

## 2021-10-21 ENCOUNTER — OFFICE VISIT (OUTPATIENT)
Dept: OTOLARYNGOLOGY | Facility: CLINIC | Age: 76
End: 2021-10-21
Payer: MEDICARE

## 2021-10-21 VITALS
BODY MASS INDEX: 20.83 KG/M2 | SYSTOLIC BLOOD PRESSURE: 140 MMHG | WEIGHT: 122 LBS | DIASTOLIC BLOOD PRESSURE: 80 MMHG | HEIGHT: 64 IN | OXYGEN SATURATION: 97 % | HEART RATE: 67 BPM

## 2021-10-21 DIAGNOSIS — H90.3 SENSORINEURAL HEARING LOSS (SNHL) OF BOTH EARS: Primary | ICD-10-CM

## 2021-10-21 PROCEDURE — 99999 PR OFFICE/OUTPT VISIT,PROCEDURE ONLY: CPT | Performed by: AUDIOLOGIST

## 2021-10-21 PROCEDURE — 99213 OFFICE O/P EST LOW 20 MIN: CPT | Mod: 25 | Performed by: OTOLARYNGOLOGY

## 2021-10-21 PROCEDURE — 92567 TYMPANOMETRY: CPT | Performed by: AUDIOLOGIST

## 2021-10-21 PROCEDURE — 92557 COMPREHENSIVE HEARING TEST: CPT | Performed by: AUDIOLOGIST

## 2021-10-21 ASSESSMENT — ENCOUNTER SYMPTOMS
WEAKNESS: 0
TROUBLE SWALLOWING: 0
DYSPHORIC MOOD: 0
VOMITING: 0
ADENOPATHY: 0
NAUSEA: 0
VOICE CHANGE: 0
POLYPHAGIA: 0
HEADACHES: 0
BACK PAIN: 0
WHEEZING: 0
DIZZINESS: 0
FEVER: 0
SINUS PRESSURE: 0
COUGH: 0
SHORTNESS OF BREATH: 0
RHINORRHEA: 0
NERVOUS/ANXIOUS: 0
FATIGUE: 0
CONSTIPATION: 0
DIARRHEA: 0
PALPITATIONS: 0
FREQUENCY: 0
ARTHRALGIAS: 0
MYALGIAS: 0
SLEEP DISTURBANCE: 0

## 2021-10-21 NOTE — PROGRESS NOTES
[facility]  [address], [address]  [place] [address]  Phone: [phone]  Fax: [phone]      Patient ID: Kandi Garnica                              : 1945    Visit Date: 10/21/2021  Referring Provider: Mia Marques MD    Chief Complaint: Hearing Loss      Kandi Garnica returned to the [place] office today in regard to her history of sensorineural hearing loss.    I last saw Ms. Garnica 3 years ago in regard to hearing loss.  I had recommended hearing aids at the time but she deferred the process.  She returns now, more interested in amplification.  Since that last visit, she has done well in regard to her own health.  However, her  fell and broke his hip in August and is only now starting to make some recovery.  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    Ms. Garnica has mild nonbothersome tinnitus.  She has never had vertigo.  She does ask people to repeat themselves and is certain that her hearing has decayed further.    Review of Systems   Constitutional: Negative for fatigue and fever.   HENT: Positive for hearing loss and tinnitus. Negative for congestion, nosebleeds, postnasal drip, rhinorrhea, sinus pressure, trouble swallowing and voice change.    Eyes: Negative for visual disturbance.   Respiratory: Negative for cough, shortness of breath and wheezing.    Cardiovascular: Negative for chest pain and palpitations.   Gastrointestinal: Negative for constipation, diarrhea, nausea and vomiting.   Endocrine: Negative for polyphagia and polyuria.   Genitourinary: Negative for frequency.   Musculoskeletal: Negative for arthralgias, back pain, gait problem and myalgias.   Skin: Negative for rash.   Allergic/Immunologic: Negative for environmental allergies.   Neurological: Negative for dizziness, weakness and headaches.   Hematological: Negative for adenopathy.   Psychiatric/Behavioral: Negative for dysphoric mood and sleep disturbance.  "The patient is not nervous/anxious.        Current Medications: None.    Physical Exam:  Vitals:   Visit Vitals  /80 (BP Location: Left upper arm, Patient Position: Sitting)   Pulse 67   Ht 1.626 m (5' 4\")   Wt 55.3 kg (122 lb)   SpO2 97%   BMI 20.94 kg/m²     General:  Well-developed, well-nourished 75 y.o. who is in no acute distress.  Voice: Normal without hoarseness, breathiness or stridor.  Head/face:  No scars or lesions.  No parotid masses. No presinus tenderness. Seventh cranial nerves intact.  Eyes:  Extraocular movements and gaze alignment normal.  Ears: Auricles normal.  Minimal dry cerumen removed from the left external auditory canal with curette.  Canal skin is dry.  Tympanic membranes clear, mobile and without retraction or scar.  Nose:  Dorsum straight.  Septum sinusoidal without obstruction.  Turbinates purple, normal in size and orientation.  No pus, polyps or crusts.  Oral Cavity/oropharynx: Normal tongue thrust. Normal gag reflex. No masses, leukoplakia, erythroplakia, ulcers or other abnormalities at the tongue, floor of mouth, buccal mucosa, palate or posterior pharyngeal wall.  Larynx/hypopharynx:  Normal supraglottis.  Vocal folds smooth and white.  Arytenoids sharp and mobile.  Interarytenoid and post-glottic mucosa normal.  No masses at the base of tongue, vallecula or piriform sinuses.  Neck:  No masses, adenopathy, cervical spasm or thyroid enlargement.  Cranial Nerves II through XII: Grossly intact.  Mental status: Awake, alert and oriented ×3.  No anxiety or depression.    Audiogram: Symmetrical mild sloping to severe sensorineural hearing loss above 1000 Hz bilaterally.  Discrimination scores: 84% at 65 dB on the right, 92% at 65 dB on the left.    Tympanogram: Normal bilaterally.      Impression:  1.  Bilateral sensorineural hearing loss for which amplification is recommended.  2.  Dry ear canals with minimal cerumen, removed.    Recommendations and Plan:  1.  You have hearing " loss for which hearing aids are recommended.  We can help you here at the [place] office or you can visit any reputable audiologist.  2.  Olive oil, 2 drops to each ear 1 night per week using an eyedropper.  Put a cotton ball in the ear after placing the drops and go to bed.  Do not use Q-tips.  3.  Follow-up in 1 year.        Meenakshi Acuna MD

## 2021-10-21 NOTE — PATIENT INSTRUCTIONS
1.  You have hearing loss for which hearing aids are recommended.  We can help you here at the [place] office or you can visit any reputable audiologist.  2.  Olive oil, 2 drops to each ear 1 night per week using an eyedropper.  Put a cotton ball in the ear after placing the drops and go to bed.  Do not use Q-tips.  3.  Follow-up in 1 year.

## 2021-10-25 ENCOUNTER — IMMUNIZATION (OUTPATIENT)
Dept: IMMUNIZATION | Facility: CLINIC | Age: 76
End: 2021-10-25
Payer: MEDICARE

## 2021-10-25 PROCEDURE — 91300 COVID-19 (SARS-COV-2) VACCINE, PFIZER: CPT | Performed by: FAMILY MEDICINE

## 2021-10-25 PROCEDURE — 0004A COVID-19 (SARS-COV-2) VACCINE, PFIZER: CPT | Performed by: FAMILY MEDICINE

## 2021-11-10 ENCOUNTER — OFFICE VISIT (OUTPATIENT)
Dept: OTOLARYNGOLOGY | Facility: CLINIC | Age: 76
End: 2021-11-10

## 2021-11-10 DIAGNOSIS — H90.3 SENSORINEURAL HEARING LOSS (SNHL) OF BOTH EARS: Primary | ICD-10-CM

## 2021-11-10 PROCEDURE — 99999 PR OFFICE/OUTPT VISIT,PROCEDURE ONLY: CPT | Performed by: AUDIOLOGIST

## 2021-11-10 PROCEDURE — 92591 PR HEARING AID EXAM, BOTH EARS: CPT | Performed by: OTOLARYNGOLOGY

## 2021-11-10 NOTE — PROGRESS NOTES
Hearing Aid Evaluation:    Mrs. Garnica is here to explore hearing aid options.  She is asking some people to repeat what they say.  She considers her lifestyle quiet.  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  She enjoys music, attends lectures, dines at moderately noisy restaurants occasionally.  She has friends that acquired their hearing aids here.  She also enjoys birdwatching.      I demonstrated a pair of Resound RICs.  She noticed an ease of listening and was accepting of the difference with her own voice.    She selected a pair of rechargeable Standard technology.  I will order Resound ONE 561RTs with premium .  #1 MP receivers and medium open domes and wax traps.  Quoted $3550.      I will order these mid December.  She prefers a Tuesday 8:30 a.m. opening.  I scheduled her for 12/28/21.

## 2021-12-03 ENCOUNTER — OFFICE VISIT (OUTPATIENT)
Dept: INTERNAL MEDICINE | Facility: CLINIC | Age: 76
End: 2021-12-03
Payer: MEDICARE

## 2021-12-03 VITALS
HEART RATE: 71 BPM | RESPIRATION RATE: 20 BRPM | BODY MASS INDEX: 22.15 KG/M2 | OXYGEN SATURATION: 99 % | HEIGHT: 63 IN | WEIGHT: 125 LBS | DIASTOLIC BLOOD PRESSURE: 88 MMHG | SYSTOLIC BLOOD PRESSURE: 156 MMHG

## 2021-12-03 DIAGNOSIS — R73.9 HYPERGLYCEMIA: ICD-10-CM

## 2021-12-03 DIAGNOSIS — E78.5 HYPERLIPIDEMIA, UNSPECIFIED HYPERLIPIDEMIA TYPE: ICD-10-CM

## 2021-12-03 DIAGNOSIS — R03.0 ELEVATED BLOOD PRESSURE READING: ICD-10-CM

## 2021-12-03 DIAGNOSIS — E55.9 VITAMIN D DEFICIENCY: ICD-10-CM

## 2021-12-03 DIAGNOSIS — Z00.00 MEDICARE ANNUAL WELLNESS VISIT, SUBSEQUENT: Primary | ICD-10-CM

## 2021-12-03 LAB
25(OH)D3 SERPL-MCNC: 17 NG/ML (ref 30–100)
ALBUMIN SERPL-MCNC: 4.2 G/DL (ref 3.4–5)
ALP SERPL-CCNC: 54 IU/L (ref 35–126)
ALT SERPL-CCNC: 17 IU/L (ref 11–54)
ANION GAP SERPL CALC-SCNC: 16 MEQ/L (ref 3–15)
AST SERPL-CCNC: 19 IU/L (ref 15–41)
BASOPHILS # BLD: 0.05 K/UL (ref 0.01–0.1)
BASOPHILS NFR BLD: 1.2 %
BILIRUB SERPL-MCNC: 0.6 MG/DL (ref 0.3–1.2)
BUN SERPL-MCNC: 12 MG/DL (ref 8–20)
CALCIUM SERPL-MCNC: 9.9 MG/DL (ref 8.9–10.3)
CHLORIDE SERPL-SCNC: 99 MEQ/L (ref 98–109)
CHOLEST SERPL-MCNC: 222 MG/DL
CO2 SERPL-SCNC: 27 MEQ/L (ref 22–32)
CREAT SERPL-MCNC: 0.8 MG/DL (ref 0.6–1.1)
DIFFERENTIAL METHOD BLD: ABNORMAL
EOSINOPHIL # BLD: 0.07 K/UL (ref 0.04–0.36)
EOSINOPHIL NFR BLD: 1.6 %
ERYTHROCYTE [DISTWIDTH] IN BLOOD BY AUTOMATED COUNT: 13.7 % (ref 11.7–14.4)
EST. AVERAGE GLUCOSE BLD GHB EST-MCNC: 111 MG/DL
GFR SERPL CREATININE-BSD FRML MDRD: >60 ML/MIN/1.73M*2
GLUCOSE SERPL-MCNC: 86 MG/DL (ref 70–99)
HBA1C MFR BLD HPLC: 5.5 %
HCT VFR BLDCO AUTO: 43.8 % (ref 35–45)
HDLC SERPL-MCNC: 103 MG/DL
HDLC SERPL: 2.2 {RATIO}
HGB BLD-MCNC: 13.7 G/DL (ref 11.8–15.7)
IMM GRANULOCYTES # BLD AUTO: 0 K/UL (ref 0–0.08)
IMM GRANULOCYTES NFR BLD AUTO: 0 %
LDLC SERPL CALC-MCNC: 101 MG/DL
LYMPHOCYTES # BLD: 1.01 K/UL (ref 1.2–3.5)
LYMPHOCYTES NFR BLD: 23.3 %
MCH RBC QN AUTO: 31.1 PG (ref 28–33.2)
MCHC RBC AUTO-ENTMCNC: 31.3 G/DL (ref 32.2–35.5)
MCV RBC AUTO: 99.3 FL (ref 83–98)
MONOCYTES # BLD: 0.5 K/UL (ref 0.28–0.8)
MONOCYTES NFR BLD: 11.5 %
NEUTROPHILS # BLD: 2.7 K/UL (ref 1.7–7)
NEUTS SEG NFR BLD: 62.4 %
NONHDLC SERPL-MCNC: 119 MG/DL
NRBC BLD-RTO: 0 %
PDW BLD AUTO: 12.7 FL (ref 9.4–12.3)
PLATELET # BLD AUTO: 241 K/UL (ref 150–369)
POTASSIUM SERPL-SCNC: 4.2 MEQ/L (ref 3.6–5.1)
PROT SERPL-MCNC: 6.7 G/DL (ref 6–8.2)
RBC # BLD AUTO: 4.41 M/UL (ref 3.93–5.22)
SODIUM SERPL-SCNC: 142 MEQ/L (ref 136–144)
TRIGL SERPL-MCNC: 92 MG/DL (ref 30–149)
TSH SERPL DL<=0.05 MIU/L-ACNC: 2.21 MIU/L (ref 0.34–5.6)
WBC # BLD AUTO: 4.33 K/UL (ref 3.8–10.5)

## 2021-12-03 PROCEDURE — 82306 VITAMIN D 25 HYDROXY: CPT | Performed by: INTERNAL MEDICINE

## 2021-12-03 PROCEDURE — G0439 PPPS, SUBSEQ VISIT: HCPCS | Performed by: INTERNAL MEDICINE

## 2021-12-03 PROCEDURE — 85025 COMPLETE CBC W/AUTO DIFF WBC: CPT | Performed by: INTERNAL MEDICINE

## 2021-12-03 PROCEDURE — 84443 ASSAY THYROID STIM HORMONE: CPT | Performed by: INTERNAL MEDICINE

## 2021-12-03 PROCEDURE — 80053 COMPREHEN METABOLIC PANEL: CPT | Performed by: INTERNAL MEDICINE

## 2021-12-03 PROCEDURE — 80061 LIPID PANEL: CPT | Performed by: INTERNAL MEDICINE

## 2021-12-03 PROCEDURE — 83036 HEMOGLOBIN GLYCOSYLATED A1C: CPT | Performed by: INTERNAL MEDICINE

## 2021-12-03 ASSESSMENT — MINI COG
TOTAL SCORE: 5
COMPLETED: YES

## 2021-12-03 ASSESSMENT — PATIENT HEALTH QUESTIONNAIRE - PHQ9: SUM OF ALL RESPONSES TO PHQ9 QUESTIONS 1 & 2: 0

## 2021-12-03 NOTE — PATIENT INSTRUCTIONS
PLAN      Get blood work today  Cut out salt and stress  Check BP few times a week and send me the readings  Eye exam  Hearing aids  Keep exercising  See me in a year                       Your Personalized Prevention Plan Services (PPPS)    Preventive Services Checklist (Assumes Average Risk Unless Otherwise Noted):    Health Maintenance Topics with due status: Overdue       Topic Date Due    DEXA Scan Never done    Hepatitis C Screening Never done    DTaP, Tdap, and Td Vaccines Never done    Zoster Vaccine Never done    Pneumococcal (65 years and older) 01/01/2012    Influenza Vaccine Never done    Medicare Annual Wellness Visit 11/24/2021     Health Maintenance Topics with due status: Completed       Topic Last Completion Date    COVID-19 Vaccine 10/25/2021    Annual Falls Risk Screening 12/03/2021     Health Maintenance Topics with due status: Aged Out       Topic Date Due    Meningococcal ACWY Aged Out    HIB Vaccines Aged Out    IPV Vaccines Aged Out    HPV Vaccines Aged Out    Pneumococcal Aged Out       You May Be Eligible for These Additional Preventive Services   (Assumes Average Risk Unless Otherwise Noted)  Diabetes Screening Any 1 risk factor: hypertension, dyslipidemia, obesity, high glucose; or Any 2 risk factors: >=66yo, overweight, family history diabetes (covered every 6 months)   Hepatitis C Screening Any 1 risk factor: 1) blood transfusion before 1992,   2) current or past injection drug use (annually for high risk; if born between 4299-1077, see above for status).   Vaccine: Hepatitis B As necessary if at-risk: hemophilia, ESRD, diabetes, living with individual infected with hep B, healthcare worker with frequent contact with blood/bodily fluids (series covered once)   Sexually Transmitted Diseases (STDs) As necessary chlamydia, gonorrhea, syphilis, hepatitis B (covered annually)  HIV if any 1 risk factor present: 1) <14yo or >66yo and at increased risk or 2) 15-66yo and ask for it (covered  annually)   Lung Cancer Screening Low dose chest CT if all 3 risk factors: 1) 55-76yo, 2) smoker or quit within last 15y, 3) >=30 pack years (covered annually).  No results found for this or any previous visit.       Cholesterol Screening Both risk factors: 1) >=19yo and 2)  increased risk coronary artery disease (covered every 5 years).     Breast Cancer Screening Covered once 35-40yo, annually >=41yo (if >=49yo, see above for status).         Health Risk Factors with Personalized Education:  ----------------------------------------------------------------------------------------------------------------------  Controlling Your Blood Pressure  · Maintain a normal weight (body mass index between 18.5 and 24.9).  · Eat more fruit, vegetables and low-fat dairy.  · Eat less saturated fat and total fat.  · Lower your sodium (salt) intake.  Try to stay under 1500 mg per day, but if you cannot get your intake to be that low, at least lower it by 1000 mg.  · Stay active.  Try to get at least 90 to 150 minutes of exercise per week.  Try brisk walking, swimming, bicycling or dancing.  · Limit alcohol intake.  When you do consume alcohol, drink no more than 1 drink per day.  · If you have been prescribed medication, take it regularly and exactly as prescribed.  Let your PCP know if you have any problems or questions about your medication.  · Check your blood pressure at home or at the store.  Write down your readings and share them with your PCP  ----------------------------------------------------------------------------------------------------------------------  Controlling Your Cholesterol  · Reduce the amount of saturated and trans fat in your diet.  Limit intake of red meat.  Consume only low-fat or non-fat/skim dairy.  Limit fried food.  Cook with vegetable oils.  · Reduce your intake of sugary foods, sugary drinks and alcohol.  · Eat a diet high in fruit, vegetables and whole grains.  · Get protein from fish, poultry  and a small portion of nuts.  · Stay active.  Try to get at least 90 to 150 minutes of exercise per week.  Try brisk walking, swimming, bicycling or dancing.  · Maintain a healthy weight by balancing your diet and exercise.  · If you have been prescribed medication, take it regularly and exactly as prescribed. Let your PCP know if you have any problems or questions about your medication.  · It’s important to know your cholesterol numbers.  When recommended by your PCP, get the cholesterol blood test.  ----------------------------------------------------------------------------------------------------------------------  Maintaining Strong Bones  · Try to get at least 90 to 150 minutes of weight-bearing exercise per week.  · Ensure intake of at least 1200mg of calcium per day.  Eat foods high in calcium like milk and other dairy, green vegetables, fruit, canned fish with soft and edible bones, nuts, calcium-set tofu.  Some foods are calcium-fortified, like bread, cereal, fruit juices and mineral water.  · Help your body make vitamin D by getting 10-15 minutes per day of sunlight.    · Ensure intake of at least 600IU of vitamin D per day.  Eat foods high in vitamin D like oily fish (salmon, sardines, mackerel) and eggs.  Some foods are fortified with vitamin D, like dairy and cereals.  · Avoid high amounts of caffeine and salt, since they can cause the body to loose calcium.  · Limit alcohol intake, since it is associated with weaker bones and is associated with falls and fractures.  · Limit intake of fizzy drinks.  ----------------------------------------------------------------------------------------------------------------------  Reducing Your Risk of Falls  · Tell your PCP if any of your medications make you feel tired, dizzy, lightheaded or off-balance.  · Maintain coordination, flexibility and balance by ensuring regular physical activity.  · Limit alcohol intake to 1 drink per day.  Consider avoiding all  alcohol intake.  · Ensure good vision.  Visit an ophthalmologist or optometrist regularly for vision screening or to make sure your glasses / contact lens prescription is correct.  If you need glasses or contacts, wear them.  When you get new glasses or contacts, take time to get used to them.  Do not wear sunglasses or tinted lenses when indoors.  · Ensure good hearing.  Have your hearing checked if you are having trouble hearing, or family and friends think you cannot hear them.  If you need a hearing aid, be sure it fits well and wear it.  · Get enough rest.  Ensure about 7-9 hours of sleep every day.  · Get up slowly from your bed or chairs.  Do not start walking until you are sure you feel steady.  · Wear non-skid, rubber-soled, low-heeled shoes.  Do not walk in socks, or in shoes and slippers with smooth soles.  · If your PCP or therapist recommends using a cane or walker, use it regularly.  · Make your home safer.  Increase lighting throughout the house, especially at the top and bottom of stairs.  Ensure lighting is easily turned on when getting up in the middle of the night.  Make sure there are two secure rails on all stairs.  Install grab bars in the bathtub / shower and near the toilet.  Consider using a shower chair and / or a hand-held shower.  · Spread sand or salt on icy surfaces.  Beware of wet surfaces, which can be icy.  · Tell your PCP if you have fallen.

## 2021-12-03 NOTE — ASSESSMENT & PLAN NOTE
Blood pressure was  elevated on initial exam but improved on repeat check   we discussed she will cut out salt and caffeine   she will monitor her blood pressure at home and call me back with a few readings we discussed the importance of treating the blood pressure If it is persistently elevated

## 2021-12-03 NOTE — PROGRESS NOTES
"Subjective     Kandi Garnica is a 76 y.o. female who presents for a subsequent annual wellness visit.     Patient Care Team:  Mia Marques MD as PCP - General  Dr Acuna   ENT    Here for wellness exam and discuss ongoing issues  Has been stressed due to her  medical issues  He  fell and broke his hip and is making slow recovery  She has had to take care of a lot of things, was very exhausted,slowly feeling better  Has had elevated BP ,mild at Dr Acuna  Denies headache,dizziness etc      Comprehensive Medical and Social History  Patient Active Problem List   Diagnosis   • Medicare annual wellness visit, subsequent   • Hyperlipidemia   • Hyperglycemia   • Vitamin D deficiency   • Pain of foot   • SNHL (sensorineural hearing loss)   • Elevated blood pressure reading     Past Medical History:   Diagnosis Date   • Arthritis    • Hammer toes, bilateral    • Hearing loss    • Lipid disorder      Past Surgical History:   Procedure Laterality Date   • BUNIONECTOMY     •  SECTION     • EYE SURGERY      bilateral cataracts     No Known Allergies  No current outpatient medications on file.     No current facility-administered medications for this visit.     Social History     Tobacco Use   • Smoking status: Never Smoker   • Smokeless tobacco: Never Used   Substance Use Topics   • Alcohol use: Yes     Comment: wine   • Drug use: No     Family History   Problem Relation Age of Onset   • Heart disease Biological Mother    • Heart disease Biological Father        Objective   Vitals  Vitals:    21 0708 21 0734   BP: (!) 160/92 (!) 156/88   Pulse: 71    Resp: 20    SpO2: 99%    Weight: 56.7 kg (125 lb)    Height: 1.6 m (5' 3\")  Comment: measured w/o shoes      Body mass index is 22.14 kg/m².    Advanced Care Plan  Does patient have advance directive?: Yes           Does patient have current OOH DNR form?: Yes           Does patient have current POLST?: Yes             PHQ  Will the patient " answer the depression questions?: Yes   Little interest or pleasure in doing things: Not at all   Feeling down, depressed, or hopeless: Not at all   Depression Risk: 0                                             Mini Cog  Completed: Yes  Score: 5  Result: Negative      Get Up and Go  Result: Pass    STEADI Falls Risk  One or more falls in the last year: No           Has trouble stepping up onto a curb: No   Advised to use a cane or walker to get around safely: No   Often has to rush to the toilet: No   Feels unsteady when walking: No   Has lost some feeling in feet: No   Often feels sad or depressed: No   Steadies self on furniture while walking at home: No   Takes medication that makes him/her feel lightheaded or more tired than usual: No   Worried about falling: No   Takes medicine to sleep or improve mood: No   Needs to push with hands when rising from a chair: No   Falls screen completed: Yes     Hearing and Vision Screening  No exam data present  See HRA for relevant hearing screening response.    Assessment/Plan   Diagnoses and all orders for this visit:    Medicare annual wellness visit, subsequent (Primary)  Assessment & Plan:  Health maintenance measures reviewed  Labs drawn today  She sees Dr. Ford for GYN  She has declined colonoscopy, mammograms and DEXA scan  She received the Covid vaccine  She declined all other vaccines  Eyes and dental exams advised  She did well on the mini cog and the clock test   safety precautions reviewed   she sees Dr. Acuna for hearing evaluations and is getting hearing aids   she saw Dr. De La Rosa for podiatry      Elevated blood pressure reading  Assessment & Plan:  Blood pressure was  elevated on initial exam but improved on repeat check   we discussed she will cut out salt and caffeine   she will monitor her blood pressure at home and call me back with a few readings we discussed the importance of treating the blood pressure If it is persistently  elevated      Hyperlipidemia, unspecified hyperlipidemia type  Assessment & Plan:  History of elevated lipids  Diet and exercise discussed    Orders:  -     CBC and differential  -     Comprehensive metabolic panel  -     Lipid panel  -     TSH    Hyperglycemia  Assessment & Plan:  Check fasting blood sugar and A1c    Orders:  -     Hemoglobin A1c    Vitamin D deficiency  Assessment & Plan:  History of low vitamin D  Check labs    Orders:  -     Vitamin D 25 hydroxy      See Patient Instructions (the written plan) which was given to the patient for PPPS and health risk factors with interventions.

## 2021-12-03 NOTE — ASSESSMENT & PLAN NOTE
Health maintenance measures reviewed  Labs drawn today  She sees Dr. Ford for GYN  She has declined colonoscopy, mammograms and DEXA scan  She received the Covid vaccine  She declined all other vaccines  Eyes and dental exams advised  She did well on the mini cog and the clock test   safety precautions reviewed   she sees Dr. Acuna for hearing evaluations and is getting hearing aids   she saw Dr. De La Rosa for podiatry

## 2021-12-06 ENCOUNTER — TELEPHONE (OUTPATIENT)
Dept: INTERNAL MEDICINE | Facility: CLINIC | Age: 76
End: 2021-12-06
Payer: MEDICARE

## 2021-12-06 NOTE — TELEPHONE ENCOUNTER
Spoke with patient's  Freddie who told me that the patient had reviewed my message on the portal and will start the vitamin D3 2000 units daily as advised   she was unavailable to take my call

## 2021-12-28 ENCOUNTER — OFFICE VISIT (OUTPATIENT)
Dept: OTOLARYNGOLOGY | Facility: CLINIC | Age: 76
End: 2021-12-28

## 2021-12-28 DIAGNOSIS — H90.3 SENSORINEURAL HEARING LOSS (SNHL) OF BOTH EARS: Primary | ICD-10-CM

## 2021-12-28 PROCEDURE — 99999 PR OFFICE/OUTPT VISIT,PROCEDURE ONLY: CPT | Performed by: AUDIOLOGIST

## 2021-12-28 PROCEDURE — V5261 HEARING AID, DIGIT, BIN, BTE: HCPCS | Performed by: OTOLARYNGOLOGY

## 2021-12-28 NOTE — PROGRESS NOTES
Hearing Aid  of [employer]  RT hearing aids with premium .    Speechmapping showed good gain for soft and moderate speech inputs; Maximum Power Output was under normative Loudness Discomfort Levels.        She did well inserting and extracting the hearing aids.  I reviewed the  and how to turn the aids off.  I reviewed cleaning and showed her the wax trap.      We will meet in 2 weeks.

## 2022-02-07 ENCOUNTER — OFFICE VISIT (OUTPATIENT)
Dept: OTOLARYNGOLOGY | Facility: CLINIC | Age: 77
End: 2022-02-07

## 2022-02-07 DIAGNOSIS — H90.3 SENSORINEURAL HEARING LOSS (SNHL) OF BOTH EARS: Primary | ICD-10-CM

## 2022-02-07 PROCEDURE — 99999 PR OFFICE/OUTPT VISIT,PROCEDURE ONLY: CPT | Performed by: AUDIOLOGIST

## 2022-02-07 PROCEDURE — 92593 PR HEARING AID CHECK, BOTH EARS: CPT | Performed by: OTOLARYNGOLOGY

## 2022-02-07 NOTE — PROGRESS NOTES
Mrs. Garnica has not worn the hearing aids much.  She had questions about the , cleaning, and wax traps.  She also wanted the serial numbers.        She assembled the .  I instructed her on charge time and how long the charge lasts in the hearing aids.  I also coached her on turning the hearing aids off.  I reviewed that it is possible to get 2 days of wear from one charge.  This would require turning the aids off at night.      I advocated for simplicity.  Wear the hearing aids every day a few hours, park them in the  when they are not in use.  Keep the  plugged in.    Her  had retina surgery recently and has needed her help with driving.  She has not had enough time to sit and practice with the hearing aids.      Follow up in May.

## 2022-03-09 ENCOUNTER — OFFICE VISIT (OUTPATIENT)
Dept: INTERNAL MEDICINE | Facility: CLINIC | Age: 77
End: 2022-03-09
Payer: MEDICARE

## 2022-03-09 VITALS
DIASTOLIC BLOOD PRESSURE: 100 MMHG | SYSTOLIC BLOOD PRESSURE: 144 MMHG | HEIGHT: 63 IN | BODY MASS INDEX: 22.68 KG/M2 | HEART RATE: 60 BPM | WEIGHT: 128 LBS | RESPIRATION RATE: 18 BRPM

## 2022-03-09 DIAGNOSIS — L72.0 EPIDERMAL INCLUSION CYST: Primary | ICD-10-CM

## 2022-03-09 DIAGNOSIS — M35.01 SICCA SYNDROME WITH KERATOCONJUNCTIVITIS: ICD-10-CM

## 2022-03-09 PROBLEM — H16.229 SICCA SYNDROME WITH KERATOCONJUNCTIVITIS: Status: ACTIVE | Noted: 2022-03-09

## 2022-03-09 PROCEDURE — 99213 OFFICE O/P EST LOW 20 MIN: CPT | Performed by: INTERNAL MEDICINE

## 2022-03-09 ASSESSMENT — ENCOUNTER SYMPTOMS
MUSCULOSKELETAL NEGATIVE: 1
GASTROINTESTINAL NEGATIVE: 1
NEUROLOGICAL NEGATIVE: 1
RESPIRATORY NEGATIVE: 1
CONSTITUTIONAL NEGATIVE: 1
HEMATOLOGIC/LYMPHATIC NEGATIVE: 1
ALLERGIC/IMMUNOLOGIC NEGATIVE: 1
EYES NEGATIVE: 1
CARDIOVASCULAR NEGATIVE: 1
ENDOCRINE NEGATIVE: 1
PSYCHIATRIC NEGATIVE: 1

## 2022-03-09 NOTE — PROGRESS NOTES
"SUBJECTIVE:   76 y.o. female for a same day visit    Lump on finger: the patient has had a lump on her L index finger for the past 6 mos, she has had lumps like this before but this is much bigger and longer lasting than her previous lumps.  The lumps are always on her fingers.  Not painful.          Past Medical History:   Diagnosis Date   • Arthritis    • Hammer toes, bilateral    • Hearing loss    • Lipid disorder      Patient Active Problem List   Diagnosis   • Medicare annual wellness visit, subsequent   • Hyperlipidemia   • Hyperglycemia   • Vitamin D deficiency   • Pain of foot   • SNHL (sensorineural hearing loss)   • Elevated blood pressure reading   • Sicca syndrome with keratoconjunctivitis (CMS/[facility])     Social History     Tobacco Use   • Smoking status: Never Smoker   • Smokeless tobacco: Never Used   Substance Use Topics   • Alcohol use: Yes     Comment: wine   • Drug use: No     No current outpatient medications on file.   No Known Allergies    Review of Systems   Constitutional: Negative.    HENT: Negative.    Eyes: Negative.    Respiratory: Negative.    Cardiovascular: Negative.    Gastrointestinal: Negative.    Endocrine: Negative.    Genitourinary: Negative.    Musculoskeletal: Negative.    Skin: Negative.    Allergic/Immunologic: Negative.    Neurological: Negative.    Hematological: Negative.    Psychiatric/Behavioral: Negative.         OBJECTIVE:  Visit Vitals  BP (!) 144/100   Pulse 60   Resp 18   Ht 1.6 m (5' 3\")   Wt 58.1 kg (128 lb)   BMI 22.67 kg/m²      Gait:  Normal  Alert and well appearing   Awake and oriented with normal affect and appropriate behavior   Skin:  1cm mobile non-tender fluid filled cyst overlying the L first phalanx without evidence of infection    Assessment/Plan     1. Epidermal inclusion cyst  Benign appearing, differential includes ganglion cyst though doesn't appear to be arising from the joint, likely able to be easily removed as in office procedure with derm vs. " aspiration  - Ambulatory referral to Dermatology; Future  - advised on signs of infection that would warrant more urgent follow up    2. Sicca syndrome with keratoconjunctivitis (CMS/HCC)  - stable and well controlled  - following with ophthalmology

## 2022-03-14 ENCOUNTER — TRANSCRIBE ORDERS (OUTPATIENT)
Dept: SCHEDULING | Age: 77
End: 2022-03-14

## 2022-03-14 DIAGNOSIS — R03.0 ELEVATED BLOOD-PRESSURE READING, WITHOUT DIAGNOSIS OF HYPERTENSION: Primary | ICD-10-CM

## 2022-03-14 DIAGNOSIS — Z82.49 FAMILY HISTORY OF ISCHEMIC HEART DISEASE AND OTHER DISEASES OF THE CIRCULATORY SYSTEM: ICD-10-CM

## 2022-04-18 ENCOUNTER — HOSPITAL ENCOUNTER (OUTPATIENT)
Dept: RADIOLOGY | Facility: HOSPITAL | Age: 77
Discharge: HOME | End: 2022-04-18
Attending: INTERNAL MEDICINE
Payer: MEDICARE

## 2022-04-18 DIAGNOSIS — Z82.49 FAMILY HISTORY OF ISCHEMIC HEART DISEASE AND OTHER DISEASES OF THE CIRCULATORY SYSTEM: ICD-10-CM

## 2022-04-18 DIAGNOSIS — R03.0 ELEVATED BLOOD-PRESSURE READING, WITHOUT DIAGNOSIS OF HYPERTENSION: ICD-10-CM

## 2022-04-18 PROCEDURE — 75571 CT HRT W/O DYE W/CA TEST: CPT

## 2022-12-06 ENCOUNTER — OFFICE VISIT (OUTPATIENT)
Dept: INTERNAL MEDICINE | Facility: CLINIC | Age: 77
End: 2022-12-06
Payer: MEDICARE

## 2022-12-06 VITALS
WEIGHT: 128 LBS | TEMPERATURE: 98 F | SYSTOLIC BLOOD PRESSURE: 122 MMHG | HEART RATE: 86 BPM | HEIGHT: 63 IN | BODY MASS INDEX: 22.68 KG/M2 | OXYGEN SATURATION: 98 % | RESPIRATION RATE: 16 BRPM | DIASTOLIC BLOOD PRESSURE: 78 MMHG

## 2022-12-06 DIAGNOSIS — R73.9 HYPERGLYCEMIA: ICD-10-CM

## 2022-12-06 DIAGNOSIS — E78.5 HYPERLIPIDEMIA, UNSPECIFIED HYPERLIPIDEMIA TYPE: ICD-10-CM

## 2022-12-06 DIAGNOSIS — E55.9 VITAMIN D DEFICIENCY: ICD-10-CM

## 2022-12-06 DIAGNOSIS — Z00.00 MEDICARE ANNUAL WELLNESS VISIT, SUBSEQUENT: Primary | ICD-10-CM

## 2022-12-06 DIAGNOSIS — R22.9 LUMP OF SKIN: ICD-10-CM

## 2022-12-06 PROCEDURE — G0439 PPPS, SUBSEQ VISIT: HCPCS | Performed by: INTERNAL MEDICINE

## 2022-12-06 RX ORDER — ROSUVASTATIN CALCIUM 20 MG/1
TABLET, COATED ORAL
COMMUNITY
Start: 2022-11-01

## 2022-12-06 ASSESSMENT — MINI COG: COMPLETED: YES

## 2022-12-06 ASSESSMENT — PATIENT HEALTH QUESTIONNAIRE - PHQ9: SUM OF ALL RESPONSES TO PHQ9 QUESTIONS 1 & 2: 0

## 2022-12-06 NOTE — PROGRESS NOTES
Subjective     Kandi Garnica is a 77 y.o. female who presents for a subsequent annual wellness visit.     Patient Care Team:  Mia Marques MD as PCP - General  Dr Logan Mendoza    Here for wellness exam and discuss chronic conditions  She follows with Dr. Acuna and has hearing aids  She saw cardiology Dr. Mendoza and had a calcium score which was 263 and also a pharmacologic stress test that was negative  She is on rosuvastatin 20 mg p.o. daily  She also has the lump on her left finger which has increased in size  She saw Dr. Guidry in 2022 and was advised to see dermatology  As per the patient it was aspirated but the cyst reappeared and is larger in size  She has known history of Raynaud's in both hands            Comprehensive Medical and Social History  Patient Active Problem List   Diagnosis   • Medicare annual wellness visit, subsequent   • Hyperlipidemia   • Hyperglycemia   • Vitamin D deficiency   • Pain of foot   • SNHL (sensorineural hearing loss)   • Elevated blood pressure reading   • Sicca syndrome with keratoconjunctivitis (CMS/HCC)   • Lump of skin     Past Medical History:   Diagnosis Date   • Arthritis    • Hammer toes, bilateral    • Hearing loss    • Lipid disorder      Past Surgical History:   Procedure Laterality Date   • BUNIONECTOMY     •  SECTION     • EYE SURGERY      bilateral cataracts     No Known Allergies  Current Outpatient Medications   Medication Sig Dispense Refill   • rosuvastatin (CRESTOR) 20 mg tablet        No current facility-administered medications for this visit.     Social History     Tobacco Use   • Smoking status: Never   • Smokeless tobacco: Never   Substance Use Topics   • Alcohol use: Yes     Comment: wine   • Drug use: No     Family History   Problem Relation Age of Onset   • Heart disease Biological Mother    • Heart disease Biological Father        Objective   Vitals  Vitals:    22 0726   BP: 122/78  "  Pulse: 86   Resp: 16   Temp: 36.7 °C (98 °F)   TempSrc: Temporal   SpO2: 98%   Weight: 58.1 kg (128 lb)   Height: 1.6 m (5' 3\")     Body mass index is 22.67 kg/m².    Advanced Care Plan  Does patient have advance directive?: Yes                                     PHQ  Will the patient answer the depression questions?: Yes   Little interest or pleasure in doing things: Not at all   Feeling down, depressed, or hopeless: Not at all   Depression Risk: 0                                             Mini Cog  Completed: Yes      Get Up and Go  Result: Pass    STEADI Falls Risk  One or more falls in the last year: No           Has trouble stepping up onto a curb: No   Advised to use a cane or walker to get around safely: No   Often has to rush to the toilet: No   Feels unsteady when walking: No   Has lost some feeling in feet: No   Often feels sad or depressed: No   Steadies self on furniture while walking at home: No   Takes medication that makes him/her feel lightheaded or more tired than usual: No   Worried about falling: No   Takes medicine to sleep or improve mood: No   Needs to push with hands when rising from a chair: No   Falls screen completed: Yes     Hearing and Vision Screening  No results found.  See HRA for relevant hearing screening response.    Assessment/Plan   Diagnoses and all orders for this visit:    Medicare annual wellness visit, subsequent (Primary)  Assessment & Plan:  Physical exam done  Health maintenance measures reviewed  Labs ordered  She follows with Dr. Ford for GYN  She has declined colonoscopy, mammograms and DEXA scan  She received 3 doses of the COVID-vaccine  She declined all other vaccines  Eyes and dental exams advised she did well on the mini cog and the clock test  Safety precautions reviewed  She saw Dr. Acuna for hearing evaluation and has hearing aids  She also saw Dr. De La Rosa for podiatry         Hyperlipidemia, unspecified hyperlipidemia type  Assessment & Plan:  Fair " control  She will continue the rosuvastatin 20 mg p.o. daily  Labs ordered  She follows with Dr. Mendoza    Orders:  -     CBC and differential; Future  -     Comprehensive metabolic panel; Future  -     Lipid panel; Future  -     TSH; Future    Hyperglycemia  Assessment & Plan:  Check fasting blood sugar and A1c    Orders:  -     Hemoglobin A1c; Future    Vitamin D deficiency  Assessment & Plan:  History of low vitamin D  Check labs    Orders:  -     Vitamin D 25 hydroxy; Future    Lump of skin  Assessment & Plan:  Not controlled  Likely epidermal cyst on the left index finger  No signs of redness or erythema   advised her to consult Dr. Ele Galvan for excision      Orders:  -     Ambulatory referral to General Surgery; Future      See Patient Instructions (the written plan) which was given to the patient for PPPS and health risk factors with interventions.

## 2022-12-06 NOTE — PATIENT INSTRUCTIONS
PLAN      Get fasting blood work  Consult Dr Galvan or her partners for the cyst  See Dr Mendoza  Remain active  See me rashida  year                         Your Personalized Prevention Plan Services (PPPS)    Preventive Services Checklist (Assumes Average Risk Unless Otherwise Noted):    Health Maintenance Topics with due status: Overdue       Topic Date Due    DEXA Scan Never done    Hepatitis C Screening Never done    DTaP, Tdap, and Td Vaccines Never done    Zoster Vaccine Never done    Pneumococcal (65 years and older) 01/01/2008    Influenza Vaccine Never done    Medicare Annual Wellness Visit 12/03/2022     Health Maintenance Topics with due status: Not Due       Topic Last Completion Date    Depression Screening 12/06/2022     Health Maintenance Topics with due status: Completed       Topic Last Completion Date    COVID-19 Vaccine 11/21/2022    Annual Falls Risk Screening 12/06/2022     Health Maintenance Topics with due status: Aged Out       Topic Date Due    Meningococcal ACWY Aged Out    HIB Vaccines Aged Out    Hepatitis B Vaccines Aged Out    IPV Vaccines Aged Out    HPV Vaccines Aged Out       You May Be Eligible for These Additional Preventive Services   (Assumes Average Risk Unless Otherwise Noted)  Diabetes Screening Any 1 risk factor: hypertension, dyslipidemia, obesity, high glucose; or Any 2 risk factors: >=64yo, overweight, family history diabetes (covered every 6 months)   Hepatitis C Screening Any 1 risk factor: 1) blood transfusion before 1992,   2) current or past injection drug use (annually for high risk; if born between 4683-[birth year]38, see above for status).   Vaccine: Hepatitis B As necessary if at-risk: hemophilia, ESRD, diabetes, living with individual infected with hep B, healthcare worker with frequent contact with blood/bodily fluids (series covered once)   Sexually Transmitted Diseases (STDs) As necessary chlamydia, gonorrhea, syphilis, hepatitis B (covered annually)  HIV if any 1 risk  factor present: 1) <14yo or >64yo and at increased risk or 2) 15-64yo and ask for it (covered annually)   Lung Cancer Screening Low dose chest CT if all three risk factors: 1) 50-76yo, 2) smoker or quit within last 15y, 3) >=20 pack years (covered annually).  No results found for this or any previous visit.       Cholesterol Screening Both risk factors: 1) >=19yo and 2)  increased risk coronary artery disease (covered every 5 years).     Breast Cancer Screening Covered once 35-38yo, annually >=41yo (if >=51yo, see above for status).         Health Risk Factors with Personalized Education:  ----------------------------------------------------------------------------------------------------------------------  Controlling Your Cholesterol  · Reduce the amount of saturated and trans fat in your diet.  Limit intake of red meat.  Consume only low-fat or non-fat/skim dairy.  Limit fried food.  Cook with vegetable oils.  · Reduce your intake of sugary foods, sugary drinks and alcohol.  · Eat a diet high in fruit, vegetables and whole grains.  · Get protein from fish, poultry and a small portion of nuts.  · Stay active.  Try to get at least 90 to 150 minutes of exercise per week.  Try brisk walking, swimming, bicycling or dancing.  · Maintain a healthy weight by balancing your diet and exercise.  · If you have been prescribed medication, take it regularly and exactly as prescribed. Let your PCP know if you have any problems or questions about your medication.  · It’s important to know your cholesterol numbers.  When recommended by your PCP, get the cholesterol blood test.  ----------------------------------------------------------------------------------------------------------------------  Maintaining Strong Bones  · Try to get at least 90 to 150 minutes of weight-bearing exercise per week.  · Ensure intake of at least 1200mg of calcium per day.  Eat foods high in calcium like milk and other dairy, green vegetables, fruit,  canned fish with soft and edible bones, nuts, calcium-set tofu.  Some foods are calcium-fortified, like bread, cereal, fruit juices and mineral water.  · Help your body make vitamin D by getting 10-15 minutes per day of sunlight.    · Ensure intake of at least 600IU of vitamin D per day.  Eat foods high in vitamin D like oily fish (salmon, sardines, mackerel) and eggs.  Some foods are fortified with vitamin D, like dairy and cereals.  · Avoid high amounts of caffeine and salt, since they can cause the body to loose calcium.  · Limit alcohol intake, since it is associated with weaker bones and is associated with falls and fractures.  · Limit intake of fizzy drinks.  ----------------------------------------------------------------------------------------------------------------------  Reducing Your Risk of Falls  · Tell your PCP if any of your medications make you feel tired, dizzy, lightheaded or off-balance.  · Maintain coordination, flexibility and balance by ensuring regular physical activity.  · Limit alcohol intake to 1 drink per day.  Consider avoiding all alcohol intake.  · Ensure good vision.  Visit an ophthalmologist or optometrist regularly for vision screening or to make sure your glasses / contact lens prescription is correct.  If you need glasses or contacts, wear them.  When you get new glasses or contacts, take time to get used to them.  Do not wear sunglasses or tinted lenses when indoors.  · Ensure good hearing.  Have your hearing checked if you are having trouble hearing, or family and friends think you cannot hear them.  If you need a hearing aid, be sure it fits well and wear it.  · Get enough rest.  Ensure about 7-9 hours of sleep every day.  · Get up slowly from your bed or chairs.  Do not start walking until you are sure you feel steady.  · Wear non-skid, rubber-soled, low-heeled shoes.  Do not walk in socks, or in shoes and slippers with smooth soles.  · If your PCP or therapist recommends  using a cane or walker, use it regularly.  · Make your home safer.  Increase lighting throughout the house, especially at the top and bottom of stairs.  Ensure lighting is easily turned on when getting up in the middle of the night.  Make sure there are two secure rails on all stairs.  Install grab bars in the bathtub / shower and near the toilet.  Consider using a shower chair and / or a hand-held shower.  · Spread sand or salt on icy surfaces.  Beware of wet surfaces, which can be icy.  · Tell your PCP if you have fallen.

## 2022-12-07 NOTE — ASSESSMENT & PLAN NOTE
Physical exam done  Health maintenance measures reviewed  Labs ordered  She follows with Dr. Ford for GYN  She has declined colonoscopy, mammograms and DEXA scan  She received 3 doses of the COVID-vaccine  She declined all other vaccines  Eyes and dental exams advised she did well on the mini cog and the clock test  Safety precautions reviewed  She saw Dr. Acuna for hearing evaluation and has hearing aids  She also saw Dr. De La Rosa for podiatry

## 2022-12-07 NOTE — ASSESSMENT & PLAN NOTE
Fair control  She will continue the rosuvastatin 20 mg p.o. daily  Labs ordered  She follows with Dr. Mendoza

## 2022-12-07 NOTE — ASSESSMENT & PLAN NOTE
Not controlled  Likely epidermal cyst on the left index finger  No signs of redness or erythema   advised her to consult Dr. Ele Galvan for excision

## 2023-02-15 ENCOUNTER — OFFICE VISIT (OUTPATIENT)
Dept: OTOLARYNGOLOGY | Facility: CLINIC | Age: 78
End: 2023-02-15

## 2023-02-15 DIAGNOSIS — H90.3 SENSORINEURAL HEARING LOSS (SNHL) OF BOTH EARS: Primary | ICD-10-CM

## 2023-02-15 PROCEDURE — 92593 PR HEARING AID CHECK, BOTH EARS: CPT | Performed by: OTOLARYNGOLOGY

## 2023-02-15 PROCEDURE — 99999 PR OFFICE/OUTPT VISIT,PROCEDURE ONLY: CPT | Performed by: AUDIOLOGIST

## 2023-02-15 NOTE — PROGRESS NOTES
Mrs. Garnica has been wearing the hearing aids a few hours at a time a few times a week.  Her  has been through hip and retina surgeries.  He has assisted her by reading the manuals on care.      She reports a static noise.  I checked her physical fit and moved her hair out of the way.  The noise improved.  I demonstrated hair swish noise.  Expansion is ON.  She noticed improvement.    She wanted to review cleaning.  She successfully removed the dome and changed the wax trap.  I showed her how to inspect and where to clean after wearing the aids.      Follow up in one year.

## 2023-08-09 ENCOUNTER — TELEPHONE (OUTPATIENT)
Dept: INTERNAL MEDICINE | Facility: CLINIC | Age: 78
End: 2023-08-09
Payer: MEDICARE

## 2023-08-09 NOTE — TELEPHONE ENCOUNTER
[facility] Appointment Request   Provider: Dr. Marques  Appointment Type: EPP  Reason for Visit: Pt returning to call to reschedule EPP  Available Day and Time:   Best Contact Number: Please call pts landline at [phone]    The practice will reach out to schedule your appointment within the next 2 business days.

## 2023-12-11 ENCOUNTER — TELEPHONE (OUTPATIENT)
Dept: INTERNAL MEDICINE | Facility: CLINIC | Age: 78
End: 2023-12-11

## 2023-12-11 DIAGNOSIS — R73.9 HYPERGLYCEMIA: ICD-10-CM

## 2023-12-11 DIAGNOSIS — E55.9 VITAMIN D DEFICIENCY: ICD-10-CM

## 2023-12-11 DIAGNOSIS — Z00.00 HEALTH CARE MAINTENANCE: Primary | ICD-10-CM

## 2023-12-11 DIAGNOSIS — E78.5 HYPERLIPIDEMIA, UNSPECIFIED HYPERLIPIDEMIA TYPE: ICD-10-CM

## 2023-12-11 DIAGNOSIS — R73.09 ELEVATED GLUCOSE: ICD-10-CM

## 2023-12-11 NOTE — TELEPHONE ENCOUNTER
Test Order Request   Patient PCP: Mia Marques MD  Next Office Visit: 12/19/2023  Preferred Lab: [facility]  [address] [address]  Tests Requested: annual labs   , MyChart, or US Mail?: , please call when ready    The practice will reach out to discuss your request within 2 business days.

## 2023-12-11 NOTE — TELEPHONE ENCOUNTER
[facility] Appointment Request   Provider: Dr. Marques  Appointment Type: MAW  Reason for Visit: reschedule. Pt thought her appt was moved to end of January 2024.  Available Day and Time:   Best Contact Number: [phone]    The practice will reach out to schedule your appointment within the next 2 business days.

## 2024-01-30 ENCOUNTER — TELEPHONE (OUTPATIENT)
Dept: INTERNAL MEDICINE | Facility: CLINIC | Age: 79
End: 2024-01-30

## 2024-01-30 NOTE — TELEPHONE ENCOUNTER
[facility] Appointment Request   Provider: , but will see  or  if  is okay with that    Appointment Type: MAW  Reason for Visit: Pt has her MAW scheduled for 3/4 but would like something sooner because her and her  are needing paperwork filled out to provide to a continuing care group home home they are trying to get into. Pt's , Freddie Garnica dropped off the paperwork to the office today. Pt is having her blood work tomorrow.    Available Day and Time: Flexible ASAP  Best Contact Number: [phone]    The practice will reach out to schedule your appointment within the next 2 business days.

## 2024-01-31 ENCOUNTER — APPOINTMENT (OUTPATIENT)
Dept: LAB | Facility: HOSPITAL | Age: 79
End: 2024-01-31
Attending: INTERNAL MEDICINE
Payer: MEDICARE

## 2024-01-31 DIAGNOSIS — R73.09 ELEVATED GLUCOSE: ICD-10-CM

## 2024-01-31 DIAGNOSIS — R73.9 HYPERGLYCEMIA: ICD-10-CM

## 2024-01-31 DIAGNOSIS — Z00.00 HEALTH CARE MAINTENANCE: ICD-10-CM

## 2024-01-31 DIAGNOSIS — E55.9 VITAMIN D DEFICIENCY: ICD-10-CM

## 2024-01-31 DIAGNOSIS — E78.5 HYPERLIPIDEMIA, UNSPECIFIED HYPERLIPIDEMIA TYPE: ICD-10-CM

## 2024-01-31 LAB
25(OH)D3 SERPL-MCNC: 13 NG/ML (ref 30–100)
ALBUMIN SERPL-MCNC: 4.4 G/DL (ref 3.5–5.7)
ALP SERPL-CCNC: 50 IU/L (ref 34–125)
ALT SERPL-CCNC: 15 IU/L (ref 7–52)
ANION GAP SERPL CALC-SCNC: 9 MEQ/L (ref 3–15)
AST SERPL-CCNC: 20 IU/L (ref 13–39)
BASOPHILS # BLD: 0.06 K/UL (ref 0.01–0.1)
BASOPHILS NFR BLD: 1.6 %
BILIRUB SERPL-MCNC: 0.7 MG/DL (ref 0.3–1.2)
BUN SERPL-MCNC: 18 MG/DL (ref 7–25)
CALCIUM SERPL-MCNC: 9.4 MG/DL (ref 8.6–10.3)
CHLORIDE SERPL-SCNC: 103 MEQ/L (ref 98–107)
CHOLEST SERPL-MCNC: 169 MG/DL
CO2 SERPL-SCNC: 29 MEQ/L (ref 21–31)
CREAT SERPL-MCNC: 0.7 MG/DL (ref 0.6–1.2)
DIFFERENTIAL METHOD BLD: ABNORMAL
EGFRCR SERPLBLD CKD-EPI 2021: >60 ML/MIN/1.73M*2
EOSINOPHIL # BLD: 0.11 K/UL (ref 0.04–0.36)
EOSINOPHIL NFR BLD: 3 %
ERYTHROCYTE [DISTWIDTH] IN BLOOD BY AUTOMATED COUNT: 13.1 % (ref 11.7–14.4)
EST. AVERAGE GLUCOSE BLD GHB EST-MCNC: 105 MG/DL
GLUCOSE SERPL-MCNC: 83 MG/DL (ref 70–99)
HBA1C MFR BLD: 5.3 %
HCT VFR BLD AUTO: 42.4 % (ref 35–45)
HDLC SERPL-MCNC: 93 MG/DL
HDLC SERPL: 1.8 {RATIO}
HGB BLD-MCNC: 14.1 G/DL (ref 11.8–15.7)
IMM GRANULOCYTES # BLD AUTO: 0.01 K/UL (ref 0–0.08)
IMM GRANULOCYTES NFR BLD AUTO: 0.3 %
LDLC SERPL CALC-MCNC: 62 MG/DL
LYMPHOCYTES # BLD: 1.37 K/UL (ref 1.2–3.5)
LYMPHOCYTES NFR BLD: 37.5 %
MCH RBC QN AUTO: 32.5 PG (ref 28–33.2)
MCHC RBC AUTO-ENTMCNC: 33.3 G/DL (ref 32.2–35.5)
MCV RBC AUTO: 97.7 FL (ref 83–98)
MONOCYTES # BLD: 0.45 K/UL (ref 0.28–0.8)
MONOCYTES NFR BLD: 12.3 %
NEUTROPHILS # BLD: 1.65 K/UL (ref 1.7–7)
NEUTS SEG NFR BLD: 45.3 %
NONHDLC SERPL-MCNC: 76 MG/DL
NRBC BLD-RTO: 0 %
PDW BLD AUTO: 12.7 FL (ref 9.4–12.3)
PLAT MORPH BLD: NORMAL
PLATELET # BLD AUTO: 203 K/UL (ref 150–369)
PLATELET # BLD EST: ABNORMAL 10*3/UL
POTASSIUM SERPL-SCNC: 4.3 MEQ/L (ref 3.5–5.1)
PROT SERPL-MCNC: 6.9 G/DL (ref 6–8.2)
RBC # BLD AUTO: 4.34 M/UL (ref 3.93–5.22)
RBC MORPH BLD: NORMAL
SODIUM SERPL-SCNC: 141 MEQ/L (ref 136–145)
TRIGL SERPL-MCNC: 68 MG/DL
TSH SERPL DL<=0.05 MIU/L-ACNC: 3.67 MIU/L (ref 0.34–5.6)
WBC # BLD AUTO: 3.65 K/UL (ref 3.8–10.5)

## 2024-01-31 PROCEDURE — 36415 COLL VENOUS BLD VENIPUNCTURE: CPT

## 2024-01-31 PROCEDURE — 80053 COMPREHEN METABOLIC PANEL: CPT

## 2024-01-31 PROCEDURE — 85025 COMPLETE CBC W/AUTO DIFF WBC: CPT

## 2024-01-31 PROCEDURE — 82565 ASSAY OF CREATININE: CPT

## 2024-01-31 PROCEDURE — 80061 LIPID PANEL: CPT

## 2024-01-31 PROCEDURE — 83036 HEMOGLOBIN GLYCOSYLATED A1C: CPT

## 2024-01-31 PROCEDURE — 82306 VITAMIN D 25 HYDROXY: CPT

## 2024-01-31 PROCEDURE — 84443 ASSAY THYROID STIM HORMONE: CPT

## 2024-02-14 ENCOUNTER — OFFICE VISIT (OUTPATIENT)
Dept: INTERNAL MEDICINE | Facility: CLINIC | Age: 79
End: 2024-02-14
Payer: MEDICARE

## 2024-02-14 VITALS
HEIGHT: 63 IN | BODY MASS INDEX: 23.21 KG/M2 | SYSTOLIC BLOOD PRESSURE: 150 MMHG | DIASTOLIC BLOOD PRESSURE: 88 MMHG | WEIGHT: 131 LBS | RESPIRATION RATE: 17 BRPM | OXYGEN SATURATION: 99 % | HEART RATE: 72 BPM

## 2024-02-14 DIAGNOSIS — H90.5 SENSORINEURAL HEARING LOSS (SNHL), UNSPECIFIED LATERALITY: ICD-10-CM

## 2024-02-14 DIAGNOSIS — E55.9 VITAMIN D DEFICIENCY: ICD-10-CM

## 2024-02-14 DIAGNOSIS — E78.5 HYPERLIPIDEMIA, UNSPECIFIED HYPERLIPIDEMIA TYPE: ICD-10-CM

## 2024-02-14 DIAGNOSIS — R03.0 ELEVATED BLOOD PRESSURE READING: ICD-10-CM

## 2024-02-14 DIAGNOSIS — L72.9 CYST OF SKIN: ICD-10-CM

## 2024-02-14 PROBLEM — M35.01 SICCA SYNDROME WITH KERATOCONJUNCTIVITIS: Status: RESOLVED | Noted: 2022-03-09 | Resolved: 2024-02-14

## 2024-02-14 PROBLEM — H16.229 SICCA SYNDROME WITH KERATOCONJUNCTIVITIS: Status: RESOLVED | Noted: 2022-03-09 | Resolved: 2024-02-14

## 2024-02-14 PROCEDURE — 99214 OFFICE O/P EST MOD 30 MIN: CPT | Performed by: INTERNAL MEDICINE

## 2024-02-14 ASSESSMENT — ENCOUNTER SYMPTOMS
ABDOMINAL PAIN: 0
SORE THROAT: 0
DIZZINESS: 0
COUGH: 0
DYSURIA: 0
VOMITING: 0
FATIGUE: 0
FEVER: 0
BRUISES/BLEEDS EASILY: 0
DIARRHEA: 0
HEADACHES: 0
CONFUSION: 0
BLOOD IN STOOL: 0
CHILLS: 0
PALPITATIONS: 0
EYE PAIN: 0
SHORTNESS OF BREATH: 0
NAUSEA: 0
ROS SKIN COMMENTS: CYST ON FINGER
NUMBNESS: 0
BACK PAIN: 0
HEMATURIA: 0

## 2024-02-14 ASSESSMENT — PATIENT HEALTH QUESTIONNAIRE - PHQ9: SUM OF ALL RESPONSES TO PHQ9 QUESTIONS 1 & 2: 0

## 2024-02-14 NOTE — ASSESSMENT & PLAN NOTE
Her BP is elevated on repeat check also  Discussed that she needs to start medication as it is a risk factor for heart disease and stroke  She would prefer to see her cardiologist Dr Mendoza and will schedule an appt with him  Follow DASH diet  Follow up in 6 months

## 2024-02-14 NOTE — PROGRESS NOTES
Subjective      Patient ID: Kandi Garnica is a 78 y.o. female     HPI     Here for follow up on chronic conditions  Needs paperwork completed [facility] [employer] [facility]  Feels well  Exercises regularly  Keeps busy with book clubs  Follows with Dr Mendoza cardiology  Her BP has been mildly elevated in the past and was advised t monitor and follow up  She has no headaches or dizziness  Follows with Dr Govea for dermatology  Has known ganglion cysts aspirated at [place] in the past and have recurred  Declined mammogram and DEXA scan    Past Medical History:   Diagnosis Date   • Arthritis    • Hammer toes, bilateral    • Hearing loss    • Lipid disorder        Past Surgical History:   Procedure Laterality Date   • BUNIONECTOMY     •  SECTION     • EYE SURGERY      bilateral cataracts       Family History   Problem Relation Age of Onset   • Heart disease Biological Mother    • Heart disease Biological Father        Social History     Socioeconomic History   • Marital status:      Spouse name: Not on file   • Number of children: Not on file   • Years of education: Not on file   • Highest education level: Not on file   Occupational History   • Not on file   Tobacco Use   • Smoking status: Never   • Smokeless tobacco: Never   Substance and Sexual Activity   • Alcohol use: Yes     Comment: wine   • Drug use: No   • Sexual activity: Not on file   Other Topics Concern   • Not on file   Social History Narrative   • Not on file     Social Determinants of Health     Financial Resource Strain: Not on file   Food Insecurity: Not on file   Transportation Needs: Not on file   Physical Activity: Not on file   Stress: Not on file   Social Connections: Not on file   Intimate Partner Violence: Not on file   Housing Stability: Not on file       No Known Allergies    Current Outpatient Medications   Medication Sig Dispense Refill   • rosuvastatin (CRESTOR) 20 mg tablet        No current facility-administered  "medications for this visit.       Visit Vitals  BP (!) 150/88   Pulse 72   Resp 17   Ht 1.6 m (5' 3\")   Wt 59.4 kg (131 lb)   SpO2 99%   BMI 23.21 kg/m²         The following have been reviewed and updated as appropriate in this visit:   Allergies  Meds  Problems       Review of Systems   Constitutional: Negative for chills, fatigue and fever.   HENT: Negative for ear pain and sore throat.    Eyes: Negative for pain and visual disturbance.   Respiratory: Negative for cough and shortness of breath.    Cardiovascular: Negative for chest pain, palpitations and leg swelling.   Gastrointestinal: Negative for abdominal pain, blood in stool, diarrhea, nausea and vomiting.   Genitourinary: Negative for dysuria and hematuria.   Musculoskeletal: Negative for back pain.   Skin: Negative for rash.        Cyst on finger   Allergic/Immunologic: Negative for environmental allergies.   Neurological: Negative for dizziness, numbness and headaches.   Hematological: Does not bruise/bleed easily.   Psychiatric/Behavioral: Negative for confusion.       Objective       Physical Exam  Vitals and nursing note reviewed.   Constitutional:       Appearance: She is well-developed.   HENT:      Head: Normocephalic and atraumatic.      Right Ear: External ear normal.      Left Ear: External ear normal.      Nose: Nose normal.   Eyes:      Conjunctiva/sclera: Conjunctivae normal.      Pupils: Pupils are equal, round, and reactive to light.   Neck:      Thyroid: No thyromegaly.   Cardiovascular:      Rate and Rhythm: Normal rate and regular rhythm.      Heart sounds: Normal heart sounds.   Pulmonary:      Effort: Pulmonary effort is normal. No respiratory distress.      Breath sounds: Normal breath sounds.   Chest:      Chest wall: No tenderness.   Abdominal:      General: Bowel sounds are normal.      Palpations: Abdomen is soft.      Tenderness: There is no abdominal tenderness. There is no guarding.   Musculoskeletal:         General: No " tenderness. Normal range of motion.      Cervical back: Normal range of motion and neck supple.      Comments: Scattered keratosis   Lymphadenopathy:      Cervical: No cervical adenopathy.   Skin:     General: Skin is warm and dry.      Findings: No rash.      Comments: Cyst on the left  middle finger   Neurological:      Mental Status: She is alert and oriented to person, place, and time.      Cranial Nerves: No cranial nerve deficit.      Sensory: No sensory deficit.   Psychiatric:         Behavior: Behavior normal.         Assessment/Plan     Elevated blood pressure reading  Her BP is elevated on repeat check also  Discussed that she needs to start medication as it is a risk factor for heart disease and stroke  She would prefer to see her cardiologist Dr Mendoza and will schedule an appt with him  Follow DASH diet  Follow up in 6 months    Hyperlipidemia  Fair control  reviewed labs and LDL is at goal  Continue rosuvastatin 20 mg daily  Her calcium score was 263 in 4/22  Follows with cardiology Dr Mendoza    Vitamin D deficiency  Not controlled  Reviewed labs and her Vit D is low at 15  Start vitamin D3 ,5000 units daily for 3 months  And then twice a week after that  Follow up in 6 months    SNHL (sensorineural hearing loss)  Fair control  She follows with Dr Acuna /[facility] and has hearing aides    Cyst of skin  Recurrence of ganglion cyst that was excised at [place] last year  She will follow up with Dr Manuel                Forms will be completed        Mia Marques MD  2/14/2024  4:48 PM

## 2024-02-14 NOTE — ASSESSMENT & PLAN NOTE
Not controlled  Reviewed labs and her Vit D is low at 15  Start vitamin D3 ,5000 units daily for 3 months  And then twice a week after that  Follow up in 6 months

## 2024-02-14 NOTE — ASSESSMENT & PLAN NOTE
Fair control  reviewed labs and LDL is at goal  Continue rosuvastatin 20 mg daily  Her calcium score was 263 in 4/22  Follows with cardiology Dr Mendoza

## 2024-02-14 NOTE — ASSESSMENT & PLAN NOTE
Recurrence of ganglion cyst that was excised at [place] last year  She will follow up with Dr Manuel                Forms will be completed

## 2024-02-14 NOTE — PATIENT INSTRUCTIONS
PLAN    Watch the BP  Follow up with Dr Mendoza  Cont the rosuvastatin  Start vit D3, 5000 units daily for 3 months  See Dr Ely   None

## 2024-02-15 ENCOUNTER — TELEPHONE (OUTPATIENT)
Dept: INTERNAL MEDICINE | Facility: CLINIC | Age: 79
End: 2024-02-15
Payer: MEDICARE

## 2025-02-04 ENCOUNTER — TELEPHONE (OUTPATIENT)
Dept: INTERNAL MEDICINE | Facility: CLINIC | Age: 80
End: 2025-02-04
Payer: MEDICARE

## 2025-02-04 ENCOUNTER — LAB REQUISITION (OUTPATIENT)
Dept: LAB | Facility: HOSPITAL | Age: 80
End: 2025-02-04
Attending: INTERNAL MEDICINE
Payer: MEDICARE

## 2025-02-04 DIAGNOSIS — R50.9 FEVER, UNSPECIFIED: ICD-10-CM

## 2025-02-04 DIAGNOSIS — R50.9 FEVER, UNSPECIFIED FEVER CAUSE: Primary | ICD-10-CM

## 2025-02-04 LAB
FLUAV RNA SPEC QL NAA+PROBE: POSITIVE
FLUBV RNA SPEC QL NAA+PROBE: NEGATIVE
RSV RNA SPEC QL NAA+PROBE: NEGATIVE
SARS-COV-2 RNA RESP QL NAA+PROBE: NEGATIVE

## 2025-02-04 PROCEDURE — 87637 SARSCOV2&INF A&B&RSV AMP PRB: CPT | Performed by: INTERNAL MEDICINE

## 2025-02-04 NOTE — TELEPHONE ENCOUNTER
Received call from Tarun castellon RN at [facility]   URI symptoms started last night  Reports: febrile 100.4, cough, runny nose and sore throat.     Tarun requested orders for viral panel covid/flu/rsv.   Ordered and faxed to [phone]

## 2025-07-03 ENCOUNTER — APPOINTMENT (OUTPATIENT)
Dept: LAB | Facility: HOSPITAL | Age: 80
End: 2025-07-03
Attending: INTERNAL MEDICINE
Payer: MEDICARE

## 2025-07-03 DIAGNOSIS — E78.5 HYPERLIPEMIA: ICD-10-CM

## 2025-07-03 LAB
ALBUMIN SERPL-MCNC: 4.6 G/DL (ref 3.5–5.7)
ALP SERPL-CCNC: 52 IU/L (ref 34–125)
ALT SERPL-CCNC: 13 IU/L (ref 7–52)
ANION GAP SERPL CALC-SCNC: 7 MEQ/L (ref 3–15)
AST SERPL-CCNC: 21 IU/L (ref 13–39)
BILIRUB SERPL-MCNC: 0.5 MG/DL (ref 0.3–1.2)
BUN SERPL-MCNC: 13 MG/DL (ref 7–25)
CALCIUM SERPL-MCNC: 9.6 MG/DL (ref 8.6–10.3)
CHLORIDE SERPL-SCNC: 101 MEQ/L (ref 98–107)
CHOLEST SERPL-MCNC: 233 MG/DL
CO2 SERPL-SCNC: 32 MEQ/L (ref 21–31)
CREAT SERPL-MCNC: 0.7 MG/DL (ref 0.6–1.2)
EGFRCR SERPLBLD CKD-EPI 2021: >60 ML/MIN/1.73M*2
GLUCOSE SERPL-MCNC: 86 MG/DL (ref 70–99)
HDLC SERPL-MCNC: 120 MG/DL
HDLC SERPL: 1.9 {RATIO}
LDLC SERPL CALC-MCNC: 97 MG/DL
NONHDLC SERPL-MCNC: 113 MG/DL
POTASSIUM SERPL-SCNC: 4.7 MEQ/L (ref 3.5–5.1)
PROT SERPL-MCNC: 7.3 G/DL (ref 6–8.2)
SODIUM SERPL-SCNC: 140 MEQ/L (ref 136–145)
TRIGL SERPL-MCNC: 80 MG/DL

## 2025-07-03 PROCEDURE — 36415 COLL VENOUS BLD VENIPUNCTURE: CPT

## 2025-07-03 PROCEDURE — 80053 COMPREHEN METABOLIC PANEL: CPT

## 2025-07-03 PROCEDURE — 80061 LIPID PANEL: CPT
